# Patient Record
Sex: FEMALE | Race: WHITE | NOT HISPANIC OR LATINO | Employment: UNEMPLOYED | URBAN - METROPOLITAN AREA
[De-identification: names, ages, dates, MRNs, and addresses within clinical notes are randomized per-mention and may not be internally consistent; named-entity substitution may affect disease eponyms.]

---

## 2017-02-13 ENCOUNTER — HOSPITAL ENCOUNTER (OUTPATIENT)
Dept: OTHER | Facility: HOSPITAL | Age: 42
Discharge: HOME | End: 2017-02-13
Attending: PSYCHIATRY & NEUROLOGY | Admitting: UROLOGY

## 2017-02-13 LAB
ALBUMIN SERPL-MCNC: 3.4 G/DL (ref 3.5–4.8)
ALP SERPL-CCNC: 130 IU/L (ref 32–91)
ALT SERPL-CCNC: 14 IU/L (ref 14–54)
ANION GAP SERPL CALC-SCNC: 10.6 MMOL/L (ref 10–20)
AST SERPL-CCNC: 22 IU/L (ref 15–41)
BASOPHILS # BLD AUTO: 0.1 10*3/UL (ref 0–0.2)
BASOPHILS NFR BLD AUTO: 1 % (ref 0–2)
BILIRUB DIRECT SERPL-MCNC: 0.1 MG/DL (ref 0.1–0.5)
BILIRUB SERPL-MCNC: 0.3 MG/DL (ref 0.3–1.2)
BUN SERPL-MCNC: 10 MG/DL (ref 8–20)
BUN/CREAT SERPL: 16.7 (ref 5.4–26.2)
CALCIUM SERPL-MCNC: 8.9 MG/DL (ref 8.9–10.3)
CHLORIDE SERPL-SCNC: 95 MMOL/L (ref 101–111)
CONV CO2: 29 MMOL/L (ref 22–32)
CONV TOTAL PROTEIN: 7.1 G/DL (ref 6.1–7.9)
CREAT UR-MCNC: 0.6 MG/DL (ref 0.4–1)
DIFFERENTIAL METHOD BLD: (no result)
EOSINOPHIL # BLD AUTO: 0.5 10*3/UL (ref 0–0.3)
EOSINOPHIL # BLD AUTO: 5 % (ref 0–3)
ERYTHROCYTE [DISTWIDTH] IN BLOOD BY AUTOMATED COUNT: 16.3 % (ref 11.5–14.5)
GLUCOSE SERPL-MCNC: 93 MG/DL (ref 65–99)
HCT VFR BLD AUTO: 33.1 % (ref 35–49)
HGB BLD-MCNC: 11.1 G/DL (ref 12–15)
LYMPHOCYTES # BLD AUTO: 2.2 10*3/UL (ref 0.8–4.8)
LYMPHOCYTES NFR BLD AUTO: 25 % (ref 18–42)
MCH RBC QN AUTO: 28 PG (ref 26–32)
MCHC RBC AUTO-ENTMCNC: 33.5 G/DL (ref 32–36)
MCV RBC AUTO: 83.7 FL (ref 80–94)
MONOCYTES # BLD AUTO: 0.9 10*3/UL (ref 0.1–1.3)
MONOCYTES NFR BLD AUTO: 11 % (ref 2–11)
NEUTROPHILS # BLD AUTO: 5.1 10*3/UL (ref 2.3–8.6)
NEUTROPHILS NFR BLD AUTO: 58 % (ref 50–75)
NRBC BLD AUTO-RTO: 0 /100{WBCS}
NRBC/RBC NFR BLD MANUAL: 0 10*3/UL
PLATELET # BLD AUTO: 296 10*3/UL (ref 150–450)
PMV BLD AUTO: 9.7 FL (ref 7.4–10.4)
POTASSIUM SERPL-SCNC: 3.6 MMOL/L (ref 3.6–5.1)
RBC # BLD AUTO: 3.96 10*6/UL (ref 4–5.4)
SODIUM SERPL-SCNC: 131 MMOL/L (ref 136–144)
WBC # BLD AUTO: 8.8 10*3/UL (ref 4.5–11.5)

## 2017-05-22 ENCOUNTER — HOSPITAL ENCOUNTER (OUTPATIENT)
Dept: PREOP | Facility: HOSPITAL | Age: 42
Setting detail: HOSPITAL OUTPATIENT SURGERY
Discharge: HOME | End: 2017-05-22
Attending: DENTIST | Admitting: DENTIST

## 2019-01-24 ENCOUNTER — OFFICE (OUTPATIENT)
Dept: URBAN - METROPOLITAN AREA CLINIC 64 | Facility: CLINIC | Age: 44
End: 2019-01-24
Payer: COMMERCIAL

## 2019-01-24 VITALS
HEIGHT: 67 IN | WEIGHT: 162 LBS | SYSTOLIC BLOOD PRESSURE: 126 MMHG | HEART RATE: 65 BPM | DIASTOLIC BLOOD PRESSURE: 84 MMHG

## 2019-01-24 DIAGNOSIS — R11.2 NAUSEA WITH VOMITING, UNSPECIFIED: ICD-10-CM

## 2019-01-24 DIAGNOSIS — K59.00 CONSTIPATION, UNSPECIFIED: ICD-10-CM

## 2019-01-24 DIAGNOSIS — R10.84 GENERALIZED ABDOMINAL PAIN: ICD-10-CM

## 2019-01-24 DIAGNOSIS — K21.9 GASTRO-ESOPHAGEAL REFLUX DISEASE WITHOUT ESOPHAGITIS: ICD-10-CM

## 2019-01-24 PROCEDURE — 99214 OFFICE O/P EST MOD 30 MIN: CPT | Performed by: NURSE PRACTITIONER

## 2019-01-24 RX ORDER — SUCRALFATE 1 G/10ML
800 SUSPENSION ORAL
Qty: 1600 | Refills: 0 | Status: COMPLETED
Start: 2019-01-24 | End: 2019-03-18

## 2019-03-18 ENCOUNTER — OFFICE (OUTPATIENT)
Dept: URBAN - METROPOLITAN AREA CLINIC 64 | Facility: CLINIC | Age: 44
End: 2019-03-18
Payer: COMMERCIAL

## 2019-03-18 VITALS
WEIGHT: 155 LBS | HEART RATE: 78 BPM | DIASTOLIC BLOOD PRESSURE: 75 MMHG | SYSTOLIC BLOOD PRESSURE: 111 MMHG | HEIGHT: 67 IN

## 2019-03-18 DIAGNOSIS — R76.8 OTHER SPECIFIED ABNORMAL IMMUNOLOGICAL FINDINGS IN SERUM: ICD-10-CM

## 2019-03-18 DIAGNOSIS — K59.00 CONSTIPATION, UNSPECIFIED: ICD-10-CM

## 2019-03-18 DIAGNOSIS — Z72.0 TOBACCO USE: ICD-10-CM

## 2019-03-18 DIAGNOSIS — R11.2 NAUSEA WITH VOMITING, UNSPECIFIED: ICD-10-CM

## 2019-03-18 PROCEDURE — 99214 OFFICE O/P EST MOD 30 MIN: CPT | Performed by: NURSE PRACTITIONER

## 2019-03-18 RX ORDER — OMEPRAZOLE 40 MG/1
40 CAPSULE, DELAYED RELEASE ORAL
Qty: 30 | Refills: 3 | Status: COMPLETED
Start: 2016-09-15 | End: 2022-06-24

## 2019-03-18 RX ORDER — SUCRALFATE 1 G/10ML
800 SUSPENSION ORAL
Qty: 1600 | Refills: 0 | Status: COMPLETED
Start: 2019-01-24 | End: 2019-03-18

## 2019-03-18 RX ORDER — PLECANATIDE 3 MG/1
3 TABLET ORAL
Qty: 90 | Refills: 3 | Status: COMPLETED
Start: 2019-03-18 | End: 2019-05-16

## 2019-05-16 ENCOUNTER — OFFICE (OUTPATIENT)
Dept: URBAN - METROPOLITAN AREA CLINIC 64 | Facility: CLINIC | Age: 44
End: 2019-05-16
Payer: COMMERCIAL

## 2019-05-16 VITALS
WEIGHT: 156 LBS | HEIGHT: 67 IN | DIASTOLIC BLOOD PRESSURE: 75 MMHG | SYSTOLIC BLOOD PRESSURE: 118 MMHG | HEART RATE: 66 BPM

## 2019-05-16 DIAGNOSIS — R74.8 ABNORMAL LEVELS OF OTHER SERUM ENZYMES: ICD-10-CM

## 2019-05-16 DIAGNOSIS — Z80.0 FAMILY HISTORY OF MALIGNANT NEOPLASM OF DIGESTIVE ORGANS: ICD-10-CM

## 2019-05-16 DIAGNOSIS — K59.00 CONSTIPATION, UNSPECIFIED: ICD-10-CM

## 2019-05-16 PROCEDURE — 99213 OFFICE O/P EST LOW 20 MIN: CPT | Performed by: INTERNAL MEDICINE

## 2022-05-25 ENCOUNTER — HOSPITAL ENCOUNTER (EMERGENCY)
Facility: HOSPITAL | Age: 47
Discharge: HOME | End: 2022-05-26
Attending: EMERGENCY MEDICINE | Admitting: EMERGENCY MEDICINE

## 2022-05-25 DIAGNOSIS — R53.1 WEAKNESS: ICD-10-CM

## 2022-05-25 DIAGNOSIS — R56.9 SEIZURE: Primary | ICD-10-CM

## 2022-05-25 PROCEDURE — 99283 EMERGENCY DEPT VISIT LOW MDM: CPT

## 2022-05-25 PROCEDURE — 96374 THER/PROPH/DIAG INJ IV PUSH: CPT

## 2022-05-25 RX ORDER — LEVETIRACETAM 5 MG/ML
500 INJECTION INTRAVASCULAR EVERY 12 HOURS SCHEDULED
Status: DISCONTINUED | OUTPATIENT
Start: 2022-05-25 | End: 2022-05-26 | Stop reason: HOSPADM

## 2022-05-26 VITALS
RESPIRATION RATE: 15 BRPM | HEIGHT: 67 IN | TEMPERATURE: 97.9 F | BODY MASS INDEX: 22.49 KG/M2 | OXYGEN SATURATION: 92 % | WEIGHT: 143.3 LBS | DIASTOLIC BLOOD PRESSURE: 63 MMHG | SYSTOLIC BLOOD PRESSURE: 96 MMHG | HEART RATE: 80 BPM

## 2022-05-26 LAB
ANION GAP SERPL CALCULATED.3IONS-SCNC: 12 MMOL/L (ref 5–15)
BASOPHILS # BLD AUTO: 0.1 10*3/MM3 (ref 0–0.2)
BASOPHILS NFR BLD AUTO: 0.6 % (ref 0–1.5)
BUN SERPL-MCNC: 8 MG/DL (ref 6–20)
BUN/CREAT SERPL: 9.6 (ref 7–25)
CALCIUM SPEC-SCNC: 9.6 MG/DL (ref 8.6–10.5)
CHLORIDE SERPL-SCNC: 104 MMOL/L (ref 98–107)
CO2 SERPL-SCNC: 26 MMOL/L (ref 22–29)
CREAT SERPL-MCNC: 0.83 MG/DL (ref 0.57–1)
DEPRECATED RDW RBC AUTO: 47.7 FL (ref 37–54)
EGFRCR SERPLBLD CKD-EPI 2021: 86.5 ML/MIN/1.73
EOSINOPHIL # BLD AUTO: 0.4 10*3/MM3 (ref 0–0.4)
EOSINOPHIL NFR BLD AUTO: 2.3 % (ref 0.3–6.2)
ERYTHROCYTE [DISTWIDTH] IN BLOOD BY AUTOMATED COUNT: 15.3 % (ref 12.3–15.4)
GLUCOSE SERPL-MCNC: 90 MG/DL (ref 65–99)
HCT VFR BLD AUTO: 41.1 % (ref 34–46.6)
HGB BLD-MCNC: 13 G/DL (ref 12–15.9)
LYMPHOCYTES # BLD AUTO: 3.7 10*3/MM3 (ref 0.7–3.1)
LYMPHOCYTES NFR BLD AUTO: 21.7 % (ref 19.6–45.3)
MCH RBC QN AUTO: 28.4 PG (ref 26.6–33)
MCHC RBC AUTO-ENTMCNC: 31.5 G/DL (ref 31.5–35.7)
MCV RBC AUTO: 90.1 FL (ref 79–97)
MONOCYTES # BLD AUTO: 1.4 10*3/MM3 (ref 0.1–0.9)
MONOCYTES NFR BLD AUTO: 8.1 % (ref 5–12)
NEUTROPHILS NFR BLD AUTO: 11.6 10*3/MM3 (ref 1.7–7)
NEUTROPHILS NFR BLD AUTO: 67.3 % (ref 42.7–76)
NRBC BLD AUTO-RTO: 0 /100 WBC (ref 0–0.2)
PLATELET # BLD AUTO: 200 10*3/MM3 (ref 140–450)
PMV BLD AUTO: 9.5 FL (ref 6–12)
POTASSIUM SERPL-SCNC: 4.3 MMOL/L (ref 3.5–5.2)
RBC # BLD AUTO: 4.56 10*6/MM3 (ref 3.77–5.28)
SODIUM SERPL-SCNC: 142 MMOL/L (ref 136–145)
WBC NRBC COR # BLD: 17.2 10*3/MM3 (ref 3.4–10.8)

## 2022-05-26 PROCEDURE — 80048 BASIC METABOLIC PNL TOTAL CA: CPT | Performed by: EMERGENCY MEDICINE

## 2022-05-26 PROCEDURE — 25010000002 LEVETIRACETAM IN NACL 0.82% 500 MG/100ML SOLUTION: Performed by: EMERGENCY MEDICINE

## 2022-05-26 PROCEDURE — 96374 THER/PROPH/DIAG INJ IV PUSH: CPT

## 2022-05-26 PROCEDURE — 85025 COMPLETE CBC W/AUTO DIFF WBC: CPT | Performed by: EMERGENCY MEDICINE

## 2022-05-26 PROCEDURE — 36415 COLL VENOUS BLD VENIPUNCTURE: CPT | Performed by: EMERGENCY MEDICINE

## 2022-05-26 RX ADMIN — SODIUM CHLORIDE 500 ML: 9 INJECTION, SOLUTION INTRAVENOUS at 00:15

## 2022-05-26 RX ADMIN — LEVETIRACETAM 500 MG: 5 INJECTION INTRAVASCULAR at 00:15

## 2022-05-26 NOTE — ED PROVIDER NOTES
Subjective   Patient is a 47-year-old female who states she said multiple seizures today.  She states she has several seizures per week typically.  She denies complaints other than feeling weak and tired.  Family states that the patient has prolonged periods of confusion after her seizures.          Review of Systems   Constitutional: Negative for chills and fever.   HENT: Negative for congestion and sore throat.    Eyes: Negative.    Respiratory: Negative for cough and shortness of breath.    Cardiovascular: Negative for chest pain.   Gastrointestinal: Negative for abdominal pain, diarrhea, nausea and vomiting.   Endocrine: Negative for polyuria.   Genitourinary: Negative for dysuria and flank pain.   Musculoskeletal: Negative for back pain and myalgias.   Neurological: Positive for weakness. Negative for dizziness and headaches.   Psychiatric/Behavioral: Positive for confusion.       No past medical history on file.    No Known Allergies    No past surgical history on file.    No family history on file.    Social History     Socioeconomic History   • Marital status:            Objective   Physical Exam  Neurologic exam shows the patient be drowsy however she has no focal neurologic deficits.  HEENT exam shows TMs to be clear.  Oropharynx comers but sclera is nonicteric.  Neck has no adenopathy JVD or bruits.  Lungs are clear.  Heart has a regular rate rhythm without murmur or gallop.  Chest is nontender.  Abdomen is soft nontender.  Patient has normal bowel sounds.  Back has no CVA tenderness.  Extremity exam is no cyanosis or edema.  Procedures           ED Course      Results for orders placed or performed during the hospital encounter of 05/25/22   Basic Metabolic Panel    Specimen: Blood   Result Value Ref Range    Glucose 90 65 - 99 mg/dL    BUN 8 6 - 20 mg/dL    Creatinine 0.83 0.57 - 1.00 mg/dL    Sodium 142 136 - 145 mmol/L    Potassium 4.3 3.5 - 5.2 mmol/L    Chloride 104 98 - 107 mmol/L    CO2  26.0 22.0 - 29.0 mmol/L    Calcium 9.6 8.6 - 10.5 mg/dL    BUN/Creatinine Ratio 9.6 7.0 - 25.0    Anion Gap 12.0 5.0 - 15.0 mmol/L    eGFR 86.5 >60.0 mL/min/1.73   CBC Auto Differential    Specimen: Blood   Result Value Ref Range    WBC 17.20 (H) 3.40 - 10.80 10*3/mm3    RBC 4.56 3.77 - 5.28 10*6/mm3    Hemoglobin 13.0 12.0 - 15.9 g/dL    Hematocrit 41.1 34.0 - 46.6 %    MCV 90.1 79.0 - 97.0 fL    MCH 28.4 26.6 - 33.0 pg    MCHC 31.5 31.5 - 35.7 g/dL    RDW 15.3 12.3 - 15.4 %    RDW-SD 47.7 37.0 - 54.0 fl    MPV 9.5 6.0 - 12.0 fL    Platelets 200 140 - 450 10*3/mm3    Neutrophil % 67.3 42.7 - 76.0 %    Lymphocyte % 21.7 19.6 - 45.3 %    Monocyte % 8.1 5.0 - 12.0 %    Eosinophil % 2.3 0.3 - 6.2 %    Basophil % 0.6 0.0 - 1.5 %    Neutrophils, Absolute 11.60 (H) 1.70 - 7.00 10*3/mm3    Lymphocytes, Absolute 3.70 (H) 0.70 - 3.10 10*3/mm3    Monocytes, Absolute 1.40 (H) 0.10 - 0.90 10*3/mm3    Eosinophils, Absolute 0.40 0.00 - 0.40 10*3/mm3    Basophils, Absolute 0.10 0.00 - 0.20 10*3/mm3    nRBC 0.0 0.0 - 0.2 /100 WBC                                                  MDM  Number of Diagnoses or Management Options  Diagnosis management comments: Patient is no evidence of infectious or metabolic abnormality.  Electrolytes are within normal limits.  There is no renal insufficiency.  Patient was given Keppra as well as IV fluids.  On reexam she is at baseline.  She will be discharged follow with MD for recheck as needed.       Amount and/or Complexity of Data Reviewed  Clinical lab tests: reviewed    Risk of Complications, Morbidity, and/or Mortality  Presenting problems: high  Diagnostic procedures: high  Management options: high    Patient Progress  Patient progress: stable      Final diagnoses:   Seizure (HCC)   Weakness       ED Disposition  ED Disposition     ED Disposition   Discharge    Condition   Stable    Comment   --             No follow-up provider specified.       Medication List      No changes were made to  your prescriptions during this visit.          Nitin Dyer MD  05/26/22 2267

## 2022-06-24 ENCOUNTER — OFFICE (OUTPATIENT)
Dept: URBAN - METROPOLITAN AREA CLINIC 64 | Facility: CLINIC | Age: 47
End: 2022-06-24
Payer: COMMERCIAL

## 2022-06-24 VITALS
DIASTOLIC BLOOD PRESSURE: 87 MMHG | HEIGHT: 67 IN | WEIGHT: 148 LBS | SYSTOLIC BLOOD PRESSURE: 117 MMHG | HEART RATE: 81 BPM

## 2022-06-24 DIAGNOSIS — B18.2 CHRONIC VIRAL HEPATITIS C: ICD-10-CM

## 2022-06-24 PROCEDURE — 99204 OFFICE O/P NEW MOD 45 MIN: CPT | Performed by: INTERNAL MEDICINE

## 2022-06-24 RX ORDER — POLYETHYLENE GLYCOL 3350 17 G/17G
POWDER, FOR SOLUTION ORAL
Qty: 525 | Refills: 3 | Status: ACTIVE
Start: 2022-06-24

## 2022-06-24 RX ORDER — OMEPRAZOLE 40 MG/1
CAPSULE, DELAYED RELEASE ORAL
Qty: 30 | Refills: 3 | Status: ACTIVE

## 2023-04-26 ENCOUNTER — OFFICE (OUTPATIENT)
Dept: URBAN - METROPOLITAN AREA CLINIC 64 | Facility: CLINIC | Age: 48
End: 2023-04-26
Payer: COMMERCIAL

## 2023-04-26 VITALS
WEIGHT: 156.4 LBS | HEIGHT: 67 IN | DIASTOLIC BLOOD PRESSURE: 77 MMHG | HEART RATE: 113 BPM | SYSTOLIC BLOOD PRESSURE: 101 MMHG

## 2023-04-26 DIAGNOSIS — Z86.010 PERSONAL HISTORY OF COLONIC POLYPS: ICD-10-CM

## 2023-04-26 DIAGNOSIS — F17.200 NICOTINE DEPENDENCE, UNSPECIFIED, UNCOMPLICATED: ICD-10-CM

## 2023-04-26 DIAGNOSIS — R11.0 NAUSEA: ICD-10-CM

## 2023-04-26 DIAGNOSIS — Z80.0 FAMILY HISTORY OF MALIGNANT NEOPLASM OF DIGESTIVE ORGANS: ICD-10-CM

## 2023-04-26 DIAGNOSIS — F12.10 CANNABIS ABUSE, UNCOMPLICATED: ICD-10-CM

## 2023-04-26 DIAGNOSIS — K59.00 CONSTIPATION, UNSPECIFIED: ICD-10-CM

## 2023-04-26 PROCEDURE — 99214 OFFICE O/P EST MOD 30 MIN: CPT | Performed by: INTERNAL MEDICINE

## 2023-04-26 RX ORDER — LINACLOTIDE 290 UG/1
290 CAPSULE, GELATIN COATED ORAL
Qty: 90 | Refills: 3 | Status: ACTIVE
Start: 2023-04-26

## 2023-04-26 RX ORDER — SUCRALFATE 1 G/1
TABLET ORAL
Qty: 60 | Refills: 2 | Status: ACTIVE
Start: 2023-04-26

## 2023-07-05 ENCOUNTER — ON CAMPUS - OUTPATIENT (OUTPATIENT)
Dept: URBAN - METROPOLITAN AREA HOSPITAL 77 | Facility: HOSPITAL | Age: 48
End: 2023-07-05
Payer: COMMERCIAL

## 2023-07-05 DIAGNOSIS — D12.8 BENIGN NEOPLASM OF RECTUM: ICD-10-CM

## 2023-07-05 DIAGNOSIS — K64.1 SECOND DEGREE HEMORRHOIDS: ICD-10-CM

## 2023-07-05 DIAGNOSIS — K52.9 NONINFECTIVE GASTROENTERITIS AND COLITIS, UNSPECIFIED: ICD-10-CM

## 2023-07-05 PROCEDURE — 45385 COLONOSCOPY W/LESION REMOVAL: CPT | Performed by: INTERNAL MEDICINE

## 2023-07-05 PROCEDURE — 45380 COLONOSCOPY AND BIOPSY: CPT | Mod: 59 | Performed by: INTERNAL MEDICINE

## 2023-07-20 ENCOUNTER — OFFICE (OUTPATIENT)
Dept: URBAN - METROPOLITAN AREA CLINIC 64 | Facility: CLINIC | Age: 48
End: 2023-07-20
Payer: COMMERCIAL

## 2023-07-20 VITALS
SYSTOLIC BLOOD PRESSURE: 109 MMHG | HEART RATE: 74 BPM | HEIGHT: 67 IN | DIASTOLIC BLOOD PRESSURE: 80 MMHG | WEIGHT: 160 LBS

## 2023-07-20 DIAGNOSIS — R10.31 RIGHT LOWER QUADRANT PAIN: ICD-10-CM

## 2023-07-20 DIAGNOSIS — R74.8 ABNORMAL LEVELS OF OTHER SERUM ENZYMES: ICD-10-CM

## 2023-07-20 DIAGNOSIS — K59.00 CONSTIPATION, UNSPECIFIED: ICD-10-CM

## 2023-07-20 DIAGNOSIS — R11.0 NAUSEA: ICD-10-CM

## 2023-07-20 PROCEDURE — 99214 OFFICE O/P EST MOD 30 MIN: CPT

## 2023-07-20 RX ORDER — ONDANSETRON HYDROCHLORIDE 8 MG/1
TABLET, FILM COATED ORAL
Qty: 20 | Refills: 1 | Status: ACTIVE

## 2023-07-20 RX ORDER — LINACLOTIDE 290 UG/1
290 CAPSULE, GELATIN COATED ORAL
Qty: 90 | Refills: 3 | Status: ACTIVE
Start: 2023-04-26

## 2023-12-18 ENCOUNTER — OFFICE VISIT (OUTPATIENT)
Dept: NEUROLOGY | Facility: CLINIC | Age: 48
End: 2023-12-18
Payer: MEDICAID

## 2023-12-18 VITALS — WEIGHT: 171 LBS | HEIGHT: 67 IN | BODY MASS INDEX: 26.84 KG/M2

## 2023-12-18 DIAGNOSIS — M79.675 PAIN IN TOES OF BOTH FEET: ICD-10-CM

## 2023-12-18 DIAGNOSIS — M47.812 CERVICAL SPONDYLOSIS: ICD-10-CM

## 2023-12-18 DIAGNOSIS — M79.674 PAIN IN TOES OF BOTH FEET: ICD-10-CM

## 2023-12-18 DIAGNOSIS — R20.2 PARESTHESIA OF RIGHT ARM: ICD-10-CM

## 2023-12-18 DIAGNOSIS — G43.119 INTRACTABLE MIGRAINE WITH AURA WITHOUT STATUS MIGRAINOSUS: ICD-10-CM

## 2023-12-18 DIAGNOSIS — R41.3 MEMORY LOSS: Primary | ICD-10-CM

## 2023-12-18 PROBLEM — M54.50 LOW BACK PAIN: Status: ACTIVE | Noted: 2023-12-18

## 2023-12-18 PROBLEM — M79.18 MYOFASCIAL PAIN: Status: ACTIVE | Noted: 2022-10-07

## 2023-12-18 PROCEDURE — 1160F RVW MEDS BY RX/DR IN RCRD: CPT | Performed by: PSYCHIATRY & NEUROLOGY

## 2023-12-18 PROCEDURE — 99204 OFFICE O/P NEW MOD 45 MIN: CPT | Performed by: PSYCHIATRY & NEUROLOGY

## 2023-12-18 PROCEDURE — 1159F MED LIST DOCD IN RCRD: CPT | Performed by: PSYCHIATRY & NEUROLOGY

## 2023-12-18 RX ORDER — ROSUVASTATIN CALCIUM 40 MG/1
40 TABLET, COATED ORAL
COMMUNITY
Start: 2023-12-12

## 2023-12-18 RX ORDER — IBUPROFEN 600 MG/1
1 TABLET ORAL EVERY 6 HOURS
COMMUNITY
Start: 2023-11-27

## 2023-12-18 RX ORDER — SUMATRIPTAN 50 MG/1
50 TABLET, FILM COATED ORAL 2 TIMES DAILY PRN
COMMUNITY

## 2023-12-18 RX ORDER — LIDOCAINE 50 MG/G
PATCH TOPICAL
COMMUNITY
Start: 2023-11-13

## 2023-12-18 RX ORDER — MIRTAZAPINE 45 MG/1
45 TABLET, FILM COATED ORAL
COMMUNITY

## 2023-12-18 RX ORDER — OLANZAPINE 20 MG/1
TABLET ORAL
COMMUNITY

## 2023-12-18 RX ORDER — ONDANSETRON 4 MG/1
TABLET, FILM COATED ORAL
COMMUNITY

## 2023-12-18 RX ORDER — AMLODIPINE BESYLATE 5 MG/1
1 TABLET ORAL DAILY
COMMUNITY
Start: 2023-12-07

## 2023-12-18 RX ORDER — LAMOTRIGINE 25 MG/1
1 TABLET ORAL EVERY 12 HOURS SCHEDULED
COMMUNITY
Start: 2023-11-08

## 2023-12-18 RX ORDER — BUPRENORPHINE HYDROCHLORIDE AND NALOXONE HYDROCHLORIDE DIHYDRATE 8; 2 MG/1; MG/1
1 TABLET SUBLINGUAL DAILY
COMMUNITY

## 2023-12-18 RX ORDER — IBUPROFEN 800 MG/1
TABLET ORAL
COMMUNITY

## 2023-12-18 RX ORDER — DULOXETIN HYDROCHLORIDE 60 MG/1
60 CAPSULE, DELAYED RELEASE ORAL DAILY
COMMUNITY

## 2023-12-18 RX ORDER — ALBUTEROL SULFATE 90 UG/1
AEROSOL, METERED RESPIRATORY (INHALATION)
COMMUNITY

## 2023-12-18 RX ORDER — CITALOPRAM HYDROBROMIDE 10 MG/1
TABLET ORAL
COMMUNITY

## 2023-12-18 RX ORDER — OMEPRAZOLE 40 MG/1
1 CAPSULE, DELAYED RELEASE ORAL DAILY
COMMUNITY
Start: 2023-11-22

## 2023-12-18 RX ORDER — PROMETHAZINE HYDROCHLORIDE 12.5 MG/1
TABLET ORAL
COMMUNITY
Start: 2023-12-11

## 2023-12-18 RX ORDER — LEVETIRACETAM 1000 MG/1
TABLET ORAL
COMMUNITY

## 2023-12-18 RX ORDER — CHLORHEXIDINE GLUCONATE ORAL RINSE 1.2 MG/ML
SOLUTION DENTAL
COMMUNITY

## 2023-12-18 RX ORDER — LAMOTRIGINE 100 MG/1
TABLET ORAL
COMMUNITY
Start: 2023-12-15

## 2023-12-18 RX ORDER — BENZONATATE 100 MG/1
CAPSULE ORAL
COMMUNITY

## 2023-12-18 RX ORDER — GABAPENTIN 800 MG/1
800 TABLET ORAL 4 TIMES DAILY
COMMUNITY

## 2023-12-18 RX ORDER — LISDEXAMFETAMINE DIMESYLATE 60 MG/1
1 CAPSULE ORAL DAILY
COMMUNITY
Start: 2023-11-17

## 2023-12-18 RX ORDER — ATORVASTATIN CALCIUM 20 MG/1
20 TABLET, FILM COATED ORAL
COMMUNITY

## 2023-12-18 RX ORDER — AMITRIPTYLINE HYDROCHLORIDE 25 MG/1
TABLET, FILM COATED ORAL
COMMUNITY

## 2023-12-18 RX ORDER — AZELASTINE 1 MG/ML
SPRAY, METERED NASAL
COMMUNITY

## 2023-12-18 RX ORDER — LEVETIRACETAM 750 MG/1
2 TABLET ORAL EVERY 12 HOURS SCHEDULED
COMMUNITY
Start: 2023-12-04

## 2023-12-18 NOTE — PROGRESS NOTES
Chief Complaint  Headache and Peripheral Neuropathy    Subjective            Xochitl Yao presents to [facility] for HEADACHE  History of Present Illness  New patient referred by Arabella MAYNARD for headaches    Migraines  As a teenager started having migraine  Sister has had migraine.    Onset: over five years  Location:whole head  Quantity: pressure  Frequency: 6-7 a moth  Duration: last 2-3 hours  Medication: imitrex  Symptoms: nausea  Worsening factors: stress  Alleviating factors: imitrex, dark room, close eyes    She fell off a horse in 2001 since then has neck pain. And right arm pain tingling in arm radiates down arm     Neuropathy BLE, onset 4 years. Never had EMG  Toes on fire at times, no numbness or tingling.     Having trouble with memory     She has seen neurologist in the past for neuropathy, Dr. Lazo.  She reports no testing was done.    No xray of neck recently .       Family History   Problem Relation Age of Onset    Migraines Mother     Dementia Mother        Past Medical History:   Diagnosis Date    Peripheral neuropathy     Seizures        Social History     Socioeconomic History    Marital status:    Tobacco Use    Smoking status: Every Day     Packs/day: 1.50     Years: 30.00     Additional pack years: 0.00     Total pack years: 45.00     Types: Cigarettes    Smokeless tobacco: Never   Vaping Use    Vaping Use: Never used   Substance and Sexual Activity    Alcohol use: Not Currently    Drug use: Not Currently     Comment: pain    Sexual activity: Defer         Current Outpatient Medications:     albuterol sulfate  (90 Base) MCG/ACT inhaler, INHALE 1-2 PUFFS EVERY 4 TO 6 HOURS AS NEEDED, Disp: , Rfl:     amLODIPine (NORVASC) 5 MG tablet, Take 1 tablet by mouth Daily., Disp: , Rfl:     DULoxetine (CYMBALTA) 60 MG capsule, Take 1 capsule by mouth Daily., Disp: , Rfl:     gabapentin (NEURONTIN) 800 MG tablet, Take 1 tablet by mouth 4 (Four) Times a  Day., Disp: , Rfl:     ibuprofen (ADVIL,MOTRIN) 600 MG tablet, Take 1 tablet by mouth Every 6 (Six) Hours., Disp: , Rfl:     lamoTRIgine (LaMICtal) 100 MG tablet, , Disp: , Rfl:     levETIRAcetam (KEPPRA) 750 MG tablet, Take 2 tablets by mouth Every 12 (Twelve) Hours., Disp: , Rfl:     mirtazapine (REMERON) 45 MG tablet, Take 1 tablet by mouth every night at bedtime., Disp: , Rfl:     OLANZapine (zyPREXA) 20 MG tablet, olanzapine 20 mg tablet  TAKE ONE TABLET BY MOUTH AT BEDTIME, Disp: , Rfl:     omeprazole (priLOSEC) 40 MG capsule, Take 1 capsule by mouth Daily., Disp: , Rfl:     ondansetron (ZOFRAN) 4 MG tablet, ondansetron HCl 4 mg tablet  TAKE 1 TABLET BY MOUTH EVERY 4-6 HOURS AS NEEDED FOR NAUSEA, Disp: , Rfl:     promethazine (PHENERGAN) 12.5 MG tablet, TAKE ONE TABLET BY MOUTH THREE TIMES DAILY ALTERNATING WITH ZOFRAN AS NEEDED, Disp: , Rfl:     rosuvastatin (CRESTOR) 40 MG tablet, Take 1 tablet by mouth every night at bedtime., Disp: , Rfl:     SUMAtriptan (IMITREX) 50 MG tablet, Take 1 tablet by mouth 2 (Two) Times a Day As Needed., Disp: , Rfl:     Vyvanse 60 MG capsule, Take 1 capsule by mouth Daily, Disp: , Rfl:     amitriptyline (ELAVIL) 25 MG tablet, amitriptyline 25 mg tablet  TAKE ONE TABLET BY MOUTH AT BEDTIME (Patient not taking: Reported on 12/18/2023), Disp: , Rfl:     atorvastatin (LIPITOR) 20 MG tablet, Take 1 tablet by mouth every night at bedtime. (Patient not taking: Reported on 12/18/2023), Disp: , Rfl:     azelastine (ASTELIN) 0.1 % nasal spray, azelastine 137 mcg (0.1 %) nasal spray aerosol  ONE SPRAY IN EACH NASAL TWICE DAILY (Patient not taking: Reported on 12/18/2023), Disp: , Rfl:     benzonatate (TESSALON) 100 MG capsule, benzonatate 100 mg capsule  TAKE 1 TO 2 TABLETS BY MOUTH THREE TIMES DAILY AS NEEDED cough (Patient not taking: Reported on 12/18/2023), Disp: , Rfl:     buprenorphine-naloxone (SUBOXONE) 8-2 MG per SL tablet, Place 1 tablet under the tongue Daily., Disp: , Rfl:      "chlorhexidine (PERIDEX) 0.12 % solution, SWISH AND SPIT 15 MILLILITERS BY MOUTH TWICE DAILY FOR FIVE DAYS (Patient not taking: Reported on 12/18/2023), Disp: , Rfl:     citalopram (CeleXA) 10 MG tablet, citalopram 10 mg tablet  TAKE ONE TABLET BY MOUTH EVERY DAY (Patient not taking: Reported on 12/18/2023), Disp: , Rfl:     ibuprofen (ADVIL,MOTRIN) 800 MG tablet, ibuprofen 800 mg tablet  TAKE 1 TABLET BY MOUTH THREE TIMES A DAY AS NEEDED FOR PAIN (Patient not taking: Reported on 12/18/2023), Disp: , Rfl:     lamoTRIgine (LaMICtal) 25 MG tablet, Take 1 tablet by mouth Every 12 (Twelve) Hours. (Patient not taking: Reported on 12/18/2023), Disp: , Rfl:     levETIRAcetam (KEPPRA) 1000 MG tablet, levetiracetam 1,000 mg tablet  TAKE ONE TABLET BY MOUTH TWICE DAILY (Patient not taking: Reported on 12/18/2023), Disp: , Rfl:     lidocaine (LIDODERM) 5 %, APPLY 1 PATCH TO SKIN ONCE A DAY APPLY FOR 12 HOURS, THEN REMOVE. (Patient not taking: Reported on 12/18/2023), Disp: , Rfl:     Review of Systems   Constitutional:  Positive for appetite change and fatigue.   HENT:  Positive for congestion.    Eyes:  Positive for discharge.   Musculoskeletal:  Positive for back pain, joint swelling, neck pain and neck stiffness.   Allergic/Immunologic: Positive for environmental allergies.   Neurological:  Positive for dizziness, seizures, weakness, light-headedness and headaches.   Psychiatric/Behavioral:  Positive for sleep disturbance. The patient is nervous/anxious and is hyperactive.    All other systems reviewed and are negative.           Objective   Vital Signs:   Ht 170.2 cm (67\")   Wt 77.6 kg (171 lb)   BMI 26.78 kg/m²     Physical Exam  Vitals reviewed.   Constitutional:       Appearance: Normal appearance.   HENT:      Nose: Nose normal.   Eyes:      Pupils: Pupils are equal, round, and reactive to light.   Cardiovascular:      Rate and Rhythm: Normal rate.   Pulmonary:      Effort: Pulmonary effort is normal. No respiratory " distress.   Neurological:      Mental Status: She is alert and oriented to person, place, and time.      Motor: Motor strength is normal.     Gait: Gait is intact.      Deep Tendon Reflexes:      Reflex Scores:       Bicep reflexes are 2+ on the right side and 2+ on the left side.       Patellar reflexes are 3+ on the right side and 3+ on the left side.       Achilles reflexes are 2+ on the right side and 2+ on the left side.  Psychiatric:         Mood and Affect: Mood normal.         Speech: Speech normal.        Result Review :                Neurologic Exam     Mental Status   Oriented to person, place, and time.   Attention: normal.   Speech: speech is normal   Level of consciousness: alert    Cranial Nerves     CN III, IV, VI   Pupils are equal, round, and reactive to light.    CN V   Facial sensation intact.     CN VII   Facial expression full, symmetric.     CN VIII   CN VIII normal.     Motor Exam   Muscle bulk: normal    Strength   Strength 5/5 throughout.     Sensory Exam   Light touch normal.     Gait, Coordination, and Reflexes     Gait  Gait: normal    Reflexes   Right biceps: 2+  Left biceps: 2+  Right patellar: 3+  Left patellar: 3+  Right achilles: 2+  Left achilles: 2+             Assessment and Plan    Diagnoses and all orders for this visit:    1. Memory loss (Primary)  -     Vitamin D,25-Hydroxy  -     TSH Rfx On Abnormal To Free T4  -     Copper, Serum  -     Ceruloplasmin  -     Vitamin B6; Future  -     Vitamin E; Future  -     Vitamin B12; Future  -     Folate; Future  -     Comprehensive Metabolic Panel  -     T Pallidum Antibody (FTA-Ab); Future  -     CBC & Differential; Future    2. Pain in toes of both feet    3. Intractable migraine with aura without status migrainosus    4. Cervical spondylosis  -     EMG 1 Limb; Future    5. Paresthesia of right arm  -     EMG 1 Limb; Future    Pt has had migraine all life, for migraine continue imitrex prn    For the neck pain and arm paresthesia  will obtain emg of right arm and then possible mri neck    For the memory and possible neuropathy complaints will obtain labs      Follow Up   Return in about 6 months (around 6/18/2024) for return for emg of the right arm and follow up visit in 6 months.  Patient was given instructions and counseling regarding her condition or for health maintenance advice. Please see specific information pulled into the AVS if appropriate.         This document has been electronically signed by Joseph Seipel, MD on December 18, 2023 19:02 EST

## 2023-12-19 ENCOUNTER — TELEPHONE (OUTPATIENT)
Dept: NEUROLOGY | Facility: CLINIC | Age: 48
End: 2023-12-19

## 2023-12-19 NOTE — TELEPHONE ENCOUNTER
PATIENT CALLING TO REQUEST A DOCTORS NOTE FOR HER 12/18/23 APPOINTMENT THAT SHE CAN TURN IN AT SCHOOL.    PLEASE MAIL NOTE TO HOME ADDRESS.    THANK YOU

## 2024-03-11 RX ORDER — LEVETIRACETAM 750 MG/1
1500 TABLET ORAL 2 TIMES DAILY
Qty: 120 TABLET | Refills: 0 | OUTPATIENT
Start: 2024-03-11

## 2024-03-12 ENCOUNTER — TELEPHONE (OUTPATIENT)
Dept: NEUROLOGY | Facility: OTHER | Age: 49
End: 2024-03-12
Payer: MEDICAID

## 2024-03-13 DIAGNOSIS — G43.119 INTRACTABLE MIGRAINE WITH AURA WITHOUT STATUS MIGRAINOSUS: Primary | ICD-10-CM

## 2024-03-13 RX ORDER — LEVETIRACETAM 750 MG/1
1500 TABLET ORAL EVERY 12 HOURS SCHEDULED
Qty: 120 TABLET | Refills: 5 | Status: SHIPPED | OUTPATIENT
Start: 2024-03-13

## 2024-03-13 NOTE — TELEPHONE ENCOUNTER
Provider: SEIPEL, JOSEPH, MD    Caller: JOEY    Relationship to Patient: SELF    Phone Number: [phone]    Reason for Call: PT CALLING REGARDING REFILL REQUEST FOR THE KEPPRA.   STATED SHE IS OUT OF THE MEDICATION.    PLEASE CALL & ADVISE

## 2024-04-04 ENCOUNTER — TELEPHONE (OUTPATIENT)
Dept: NEUROLOGY | Facility: CLINIC | Age: 49
End: 2024-04-04
Payer: MEDICAID

## 2024-04-04 NOTE — TELEPHONE ENCOUNTER
Provider: SEIPEL    Caller: JOEY    Phone Number: [phone]     Reason for Call: PT CALLED TO TRY TO GET A SOONER APPT DUE TO HAVING VERTIGO. PT STATES THAT SHE HAS HAD THIS HAPPEN BEFORE BUT NOW SHE IS ALMOST BLACKING OUT. PT STATES SOMETIME IT LAST ALL DAY. PT ALSO STATES THAT HER NERVES ARE BAD THAT HER HANDS ARE SHAKING SHE HAS BLISTERS ON HER FACE.    PLEASE REVIEW AND ADVISE.  THANK YOU

## 2024-04-05 ENCOUNTER — HOSPITAL ENCOUNTER (EMERGENCY)
Facility: HOSPITAL | Age: 49
Discharge: HOME | End: 2024-04-05
Attending: EMERGENCY MEDICINE
Payer: MEDICAID

## 2024-04-05 VITALS
TEMPERATURE: 98.1 F | DIASTOLIC BLOOD PRESSURE: 91 MMHG | WEIGHT: 172 LBS | SYSTOLIC BLOOD PRESSURE: 129 MMHG | HEART RATE: 79 BPM | BODY MASS INDEX: 27 KG/M2 | RESPIRATION RATE: 17 BRPM | HEIGHT: 67 IN | OXYGEN SATURATION: 96 %

## 2024-04-05 DIAGNOSIS — S01.80XA: ICD-10-CM

## 2024-04-05 DIAGNOSIS — R21 RASH: Primary | ICD-10-CM

## 2024-04-05 DIAGNOSIS — G40.319 GENERALIZED IDIOPATHIC EPILEPSY AND EPILEPTIC SYNDROMES, INTRACTABLE, WITHOUT STATUS EPILEPTICUS: ICD-10-CM

## 2024-04-05 PROCEDURE — 25010000002 DEXAMETHASONE PER 1 MG

## 2024-04-05 PROCEDURE — 99283 EMERGENCY DEPT VISIT LOW MDM: CPT

## 2024-04-05 PROCEDURE — 87205 SMEAR GRAM STAIN: CPT

## 2024-04-05 PROCEDURE — 87070 CULTURE OTHR SPECIMN AEROBIC: CPT

## 2024-04-05 PROCEDURE — 96372 THER/PROPH/DIAG INJ SC/IM: CPT

## 2024-04-05 RX ORDER — DEXAMETHASONE SODIUM PHOSPHATE 4 MG/ML
8 INJECTION, SOLUTION INTRA-ARTICULAR; INTRALESIONAL; INTRAMUSCULAR; INTRAVENOUS; SOFT TISSUE ONCE
Status: COMPLETED | OUTPATIENT
Start: 2024-04-05 | End: 2024-04-05

## 2024-04-05 RX ORDER — METHYLPREDNISOLONE 4 MG/1
TABLET ORAL
Qty: 21 TABLET | Refills: 0 | Status: SHIPPED | OUTPATIENT
Start: 2024-04-05

## 2024-04-05 RX ADMIN — DEXAMETHASONE SODIUM PHOSPHATE 8 MG: 4 INJECTION, SOLUTION INTRAMUSCULAR; INTRAVENOUS at 12:10

## 2024-04-05 NOTE — ED NOTES
Pt ambulates to ED [facility]. Pt reports that she has had a rash on her face, bilateral arms and trunk x months. She reports that she was staying with a friend and was unaware that she had bed bugs and she got bit and got an infection. Pt reports that she has been seen by her PCP and is on her third round of antibiotics, currently on keflex. She reports that her rash is not getting any better and reports itching, burning and fluid filled blisters. She has been using dial soap Bid and has been applying antibacterial ointment and cortisone to the areas. She reports that she has an appt with dermatology but it is not until August. No other complaints at this time.

## 2024-04-05 NOTE — ED PROVIDER NOTES
Subjective   History of Present Illness  Chief Complaint: Rash      HPI: Patient is a 49-year-old female presents by private vehicle with complaints of rash and open wounds that have been ongoing for the last 5 to 6 months.  She initially believed that it was bedbugs exposures but she has removed herself from that situation and continues to have the open wounds she states that the areas will blister and rupture.  They are pruritic.  She is followed by [facility] who has put her on her third round of Keflex and continues to have the symptoms.  Area of rash and open wounds noted to her face as well as her neck trunk and bilateral arms.  Denies new lotions or creams she has been using antibacterial soap and water as well as hydrocortisone cream.  She has not been treated with steroids she is scheduled to see dermatology in August of this year.    PCP: [place]    History provided by:  Patient      Review of Systems  See above as noted    Past Medical History:   Diagnosis Date    Peripheral neuropathy     Seizures        No Known Allergies    History reviewed. No pertinent surgical history.    Family History   Problem Relation Age of Onset    Migraines Mother     Dementia Mother        Social History     Socioeconomic History    Marital status:    Tobacco Use    Smoking status: Every Day     Current packs/day: 1.50     Average packs/day: 1.5 packs/day for 30.0 years (45.0 ttl pk-yrs)     Types: Cigarettes    Smokeless tobacco: Never   Vaping Use    Vaping status: Never Used   Substance and Sexual Activity    Alcohol use: Not Currently    Drug use: Yes     Types: Marijuana     Comment: 3 times daily    Sexual activity: Defer           Objective   Physical Exam  Vitals reviewed.   Constitutional:       Appearance: She is not toxic-appearing.   HENT:      Head: Normocephalic.   Eyes:      Extraocular Movements: Extraocular movements intact.      Pupils: Pupils are equal, round, and reactive to light.  "  Cardiovascular:      Rate and Rhythm: Normal rate.      Pulses: Normal pulses.   Pulmonary:      Effort: Pulmonary effort is normal.   Skin:     Capillary Refill: Capillary refill takes less than 2 seconds.      Findings: Erythema, rash and wound present. Rash is pustular.      Comments: Wounds of various stages of healing noted to her face and neck trunk and bilateral arms.   Neurological:      General: No focal deficit present.      Mental Status: She is alert and oriented to person, place, and time.         Procedures           ED Course      /91   Pulse 79   Temp 98.1 °F (36.7 °C) (Oral)   Resp 17   Ht 170.2 cm (67\")   Wt 78 kg (172 lb)   SpO2 96%   BMI 26.94 kg/m²   Labs Reviewed   WOUND CULTURE     Medications   dexAMETHasone (DECADRON) injection 8 mg (has no administration in time range)     No radiology results for the last day                                         Medical Decision Making  Patient presented with above complaints, noted physical exam was completed as patient has had 3 rounds of Keflex with minimal improvement MRSA was considered and wound culture was added and pending.  Will give the patient a dose of Decadron here with a prescription for Medrol sent to her preferred pharmacy we discussed proper wound care and to continue the Keflex until wound culture does return.  She is already scheduled to see dermatology.  We did discuss other creams and over-the-counter ointments that are appropriate.  Patient gave verbal understanding and felt comfortable with this plan of care, denied further questions or complaints.    Chart review: 3/13/2024 outpatient visit with neurology Dr. Seipel, intractable migraine with aura      Note Disclaimer: At [facility], we believe that sharing information builds trust and better  relationships. You are receiving this note because you recently visited [facility]. It is possible you will see health information before a provider has talked with " you about it. This kind of information can be easy to misunderstand. To help you fully understand what it means for your health, we urge you to discuss this note with your provider.       Part of this note may be an electronic transcription/translation of spoken language to printed text using the Dragon Dictation System.    Appropriate PPE worn during exam.    Amount and/or Complexity of Data Reviewed  External Data Reviewed: notes.        Final diagnoses:   Rash   Multiple open wounds of face, initial encounter       ED Disposition  ED Disposition       ED Disposition   Discharge    Condition   Stable    Comment   --               Arabella Hernandez, APRN  [address] [facility] [place] [id number] [phone]          [facility] [place] [id number] [phone]             Medication List        New Prescriptions      methylPREDNISolone 4 MG dose pack  Commonly known as: MEDROL  Take as directed on package instructions.               Where to Get Your Medications        These medications were sent to [email or web address] - [place], [place] - [address] - [phone]  - [phone] FX  [address] [place] [address]      Phone: [phone]   methylPREDNISolone 4 MG dose pack            Estelita Hardwick, APRN  04/05/24 5828

## 2024-04-05 NOTE — DISCHARGE INSTRUCTIONS
Follow-up on wound culture, that will be pending at the time of your discharge.    Prescription for Medrol Dosepak was sent to your preferred pharmacy.  10 you to wash the areas with antibacterial soap and water can apply Aquaphor or Vaseline to the areas of discomfort.    Follow-up with PCP    Return to the ER for new or worsening symptoms

## 2024-04-05 NOTE — TELEPHONE ENCOUNTER
. Memory loss (Primary)  2. Pain in toes of both feet   3. Intractable migraine with aura without status migrainosus   4. Cervical spondylosis   5. Paresthesia of right arm  She was seen for these issues

## 2024-04-05 NOTE — TELEPHONE ENCOUNTER
Caller: Xochitl GILDA Mannkasey  Relationship: SELF  Best call back number:   Telephone Information:   Mobile [phone]         Requested Prescriptions:   Requested Prescriptions     Pending Prescriptions Disp Refills    gabapentin (NEURONTIN) 800 MG tablet [Pharmacy Med Name: gabapentin 800 mg tablet] 120 tablet 3     Sig: TAKE ONE TABLET BY MOUTH FOUR TIMES DAILY        Pharmacy where request should be sent: [facility] [address] [phone] [facility] [phone] FX     Last office visit with prescribing clinician: 12/18/2023   Last telemedicine visit with prescribing clinician: Visit date not found   Next office visit with prescribing clinician: 8/20/2024     Additional details provided by patient: PT STATES SHE ONLY HAS A FEW TABLETS LEFT.     Does the patient have less than a 3 day supply:  [] Yes  [x] No    Would you like a call back once the refill request has been completed: [] Yes [x] No    If the office needs to give you a call back, can they leave a voicemail: [] Yes [x] No    Brain Redmond Rep   04/05/24 10:29 EDT

## 2024-04-08 ENCOUNTER — TELEPHONE (OUTPATIENT)
Dept: NEUROLOGY | Facility: CLINIC | Age: 49
End: 2024-04-08

## 2024-04-08 LAB
BACTERIA SPEC AEROBE CULT: NORMAL
GRAM STN SPEC: NORMAL
GRAM STN SPEC: NORMAL

## 2024-04-08 NOTE — TELEPHONE ENCOUNTER
}    Medication requested (name and dose):      Gabapentin 800 mg tablet  Take 1 tablet by mouth 4 (Four) Times a Day     Pharmacy where request should be sent:      [place], [place] - [address] [place] [address] AVE - [phone] [facility] [phone] FX     Additional details provided by patient:      Best call back number:  [phone]    Does the patient have less than a 3 day supply:  [x] Yes  [] No    Brain Ivy Rep  04/08/24, 09:18 EDT 5}

## 2024-04-08 NOTE — TELEPHONE ENCOUNTER
Caller: JOEY    Relationship: SELF    Best call back number: [phone]      Which medication are you concerned about: GABAPENTIN    What are your concerns: PT STATES SHE HAS BEEN COMPLETELY OUT OF MEDICATION SINCE 4-424.     THERE ARE MULTIPLE ENCOUNTER OPEN REGARDING THIS REFILL BUT SINCE MY ORINGAL WAS TILL OPEN SO MESSAGE WAS ROUTED IN THIS ENCOUNTER.

## 2024-04-09 RX ORDER — GABAPENTIN 800 MG/1
TABLET ORAL
Qty: 120 TABLET | Refills: 5 | Status: SHIPPED | OUTPATIENT
Start: 2024-04-09

## 2024-04-09 RX ORDER — GABAPENTIN 800 MG/1
800 TABLET ORAL 4 TIMES DAILY
OUTPATIENT
Start: 2024-04-09

## 2024-04-09 NOTE — TELEPHONE ENCOUNTER
MEDICATION REFILL REQUEST    Caller: Xochitl Yao    Relationship: Self    Best call back number: [phone]    Requested Prescriptions:   Requested Prescriptions     Pending Prescriptions Disp Refills    gabapentin (NEURONTIN) 800 MG tablet       Sig: Take 1 tablet by mouth 4 (Four) Times a Day.      Pharmacy where request should be sent: [address] - [phone]  - [phone] FX     Last office visit with prescribing clinician: 12/18/2023   Last telemedicine visit with prescribing clinician: Visit date not found   Next office visit with prescribing clinician: 4/8/2024     Additional details provided by patient: PT CALLED TO CHECK STATUS OF GABAPENTIN REFILL REQUEST. SHE REPORTS SHE HAS BEEN COMPLETELY OUT OF THE GABAPENTIN MEDICATION SINCE LAST FRIDAY, 4/5/24.    Does the patient have less than a 3 day supply?:  [x] Yes  [] No    Would you like a call back once the refill request has been completed?: [] Yes  [x] No    If the office needs to give you a call back, can they leave a voicemail?: [] Yes  [x] No    PLEASE REVIEW AND ADVISE.    Brain Hammond Rep   04/09/24 09:12 EDT

## 2024-04-09 NOTE — TELEPHONE ENCOUNTER
PT CALLED BACK TO CHECK ON STATUS OF MEDICATION REFILL. PT HAS BEEN OUT OF MEDICATION SINCE 04/04/024.

## 2024-06-04 NOTE — PROGRESS NOTES
"Chief Complaint  Dizziness    Subjective            Xochitl Yao presents to [facility]UROLOG[facility] for Dizziness.  History of Present Illness    Xochitl Yao is a 49-year-old patient seen today for episodic dizziness.  Patient saw Dr. Seipel in 2023 but never followed-up and did not complete testing (labs and EMG).  Patient reports having episodes of dizziness that started years ago,  this improved but, about 5 months ago symptoms worsened.  She feels like she is going to lose consciousness when leaning over and standing up too fast or from a sitting to a standing position.  Patient admits that she has not seen her primary care provider about this.    The patient has migraines headaches 2-3 times a week with throbbing sensation and light and sound sensitivity.  The pain is located at her temples and feels like a \"vice\".  She has nausea and vomiting.  Symptoms last several hours.  Patient admit to having a lot of stressors in her life.  She smokes 1 pack of cigarettes/day and is not interested in quitting.  Patient smokes marijuana daily.  She only drinks around 20 to 40 ounces of water and admits to having a poor diet.  She has insomnia but has never been tested for sleep apnea.  She snores and never wakes up feeling refreshed.      Complaint: Dizziness  Onset: few years ago  quality: feels like she is going to black out  Duration:few second  Frequency: everyday  Timing: anytime, mainly when she stands up  Worsening factors: heat  Alleviating factors: nothing  associated Signs and symptoms: nausea  Current meds:   Past medications:         Orthostatics  Lyin/78  Sittin/70  Standin/61        Family History   Problem Relation Age of Onset    Migraines Mother     Dementia Mother        Past Medical History:   Diagnosis Date    Peripheral neuropathy     Seizures        Social History     Socioeconomic History    Marital status:    Tobacco Use    Smoking status: Every " Day     Current packs/day: 1.50     Average packs/day: 1.5 packs/day for 30.0 years (45.0 ttl pk-yrs)     Types: Cigarettes    Smokeless tobacco: Never   Vaping Use    Vaping status: Never Used   Substance and Sexual Activity    Alcohol use: Not Currently    Drug use: Yes     Types: Marijuana     Comment: 3 times daily    Sexual activity: Defer         Current Outpatient Medications:     albuterol sulfate  (90 Base) MCG/ACT inhaler, INHALE 1-2 PUFFS EVERY 4 TO 6 HOURS AS NEEDED, Disp: , Rfl:     amLODIPine (NORVASC) 5 MG tablet, Take 1 tablet by mouth Daily., Disp: , Rfl:     buprenorphine-naloxone (SUBOXONE) 8-2 MG per SL tablet, Place 1 tablet under the tongue Daily., Disp: , Rfl:     DULoxetine (CYMBALTA) 60 MG capsule, Take 1 capsule by mouth Daily., Disp: , Rfl:     gabapentin (NEURONTIN) 800 MG tablet, One tab qid, Disp: 120 tablet, Rfl: 5    ibuprofen (ADVIL,MOTRIN) 600 MG tablet, Take 1 tablet by mouth Every 6 (Six) Hours., Disp: , Rfl:     levETIRAcetam (KEPPRA) 750 MG tablet, Take 2 tablets by mouth Every 12 (Twelve) Hours., Disp: 120 tablet, Rfl: 5    mirtazapine (REMERON) 45 MG tablet, Take 1 tablet by mouth every night at bedtime., Disp: , Rfl:     OLANZapine (zyPREXA) 20 MG tablet, olanzapine 20 mg tablet  TAKE ONE TABLET BY MOUTH AT BEDTIME, Disp: , Rfl:     omeprazole (priLOSEC) 40 MG capsule, Take 1 capsule by mouth Daily., Disp: , Rfl:     ondansetron (ZOFRAN) 4 MG tablet, ondansetron HCl 4 mg tablet  TAKE 1 TABLET BY MOUTH EVERY 4-6 HOURS AS NEEDED FOR NAUSEA, Disp: , Rfl:     promethazine (PHENERGAN) 12.5 MG tablet, TAKE ONE TABLET BY MOUTH THREE TIMES DAILY ALTERNATING WITH ZOFRAN AS NEEDED, Disp: , Rfl:     rosuvastatin (CRESTOR) 40 MG tablet, Take 1 tablet by mouth every night at bedtime., Disp: , Rfl:     SUMAtriptan (IMITREX) 50 MG tablet, Take 50 mg PO at onset of migraine of migraine. May repeat x 1 2 hrs later if needed, Disp: 16 tablet, Rfl: 2    Vyvanse 60 MG capsule, Take 1  "capsule by mouth Daily, Disp: , Rfl:     Review of Systems   Constitutional:  Positive for fatigue.   HENT:  Positive for congestion, dental problem, drooling and voice change.    Eyes:  Positive for pain, itching and visual disturbance.   Respiratory:  Positive for cough, chest tightness and shortness of breath.    Cardiovascular:  Positive for chest pain and palpitations.   Gastrointestinal:  Positive for constipation and nausea.   Genitourinary:  Positive for frequency, pelvic pain and urgency.   Musculoskeletal:  Positive for arthralgias, back pain, gait problem, neck pain and neck stiffness.   Skin:  Positive for rash.   Allergic/Immunologic: Positive for environmental allergies.   Neurological:  Positive for weakness, light-headedness and headaches.   Hematological:  Bruises/bleeds easily.   Psychiatric/Behavioral:  Positive for sleep disturbance. The patient is nervous/anxious.    All other systems reviewed and are negative.           Objective   Vital Signs:   /70   Pulse 90   Ht 170.2 cm (67\")   Wt 78 kg (172 lb)   BMI 26.94 kg/m²     Physical Exam  Vitals reviewed.   Constitutional:       Appearance: She is well-developed.   HENT:      Head: Normocephalic and atraumatic.      Mouth/Throat:      Dentition: Abnormal dentition. Dental caries present.      Comments: MMP 2  Eyes:      Extraocular Movements: Extraocular movements intact and EOM normal.      Pupils: Pupils are equal, round, and reactive to light.   Neck:      Vascular: No carotid bruit.   Cardiovascular:      Rate and Rhythm: Normal rate.      Heart sounds: Murmur heard.   Pulmonary:      Effort: Pulmonary effort is normal.   Musculoskeletal:      Cervical back: Normal range of motion and neck supple.      Comments: Kyphotic posture   Skin:     General: Skin is warm and dry.      Findings: Lesion present. No rash.      Comments: Lesions present on arms face and legs.  Patient scratched/picked at lesions throughout visit.   Neurological: "      Mental Status: She is alert and oriented to person, place, and time.      Cranial Nerves: Cranial nerves 2-12 are intact. No cranial nerve deficit.      Sensory: No sensory deficit.      Motor: Motor function is intact. Weakness present. No tremor or atrophy.      Coordination: Coordination is intact. Finger-Nose-Finger Test, Heel to Shin Test and Heel to Shin Test normal.      Gait: Gait is intact.      Deep Tendon Reflexes: Reflexes are normal and symmetric.      Reflex Scores:       Tricep reflexes are 2+ on the right side and 2+ on the left side.       Bicep reflexes are 2+ on the right side and 2+ on the left side.       Brachioradialis reflexes are 2+ on the right side and 2+ on the left side.       Patellar reflexes are 2+ on the right side and 2+ on the left side.       Achilles reflexes are 2+ on the right side and 2+ on the left side.     Comments: Renee-Hallpike testing was negative; patient has giveaway weakness in both hip flexion and attributes this to discomfort   Psychiatric:         Attention and Perception: Attention normal.         Mood and Affect: Affect is flat.         Speech: Speech normal.         Behavior: Behavior normal.         Cognition and Memory: Cognition and memory normal.         Judgment: Judgment normal.        Result Review :                Neurologic Exam     Mental Status   Oriented to person, place, and time.   Attention: normal.   Speech: speech is normal   Level of consciousness: alert    Cranial Nerves   Cranial nerves II through XII intact.     CN II   Visual fields full to confrontation.     CN III, IV, VI   Pupils are equal, round, and reactive to light.  Extraocular motions are normal.     CN V   Facial sensation intact.     CN VII   Facial expression full, symmetric.     CN VIII   CN VIII normal.     CN IX, X   CN IX normal.   CN X normal.     CN XI   CN XI normal.     CN XII   CN XII normal.     Motor Exam   Muscle bulk: normal  Overall muscle tone: normal  Right  arm pronator drift: absent  Left arm pronator drift: absent    Strength   Right neck flexion: 5/5  Left neck flexion: 5/5  Right neck extension: 5/5  Left neck extension: 5/5  Right deltoid: 5/5  Left deltoid: 5/5  Right biceps: 5/5  Left biceps: 5/5  Right triceps: 5/5  Left triceps: 5/5  Right wrist flexion: 5/5  Left wrist flexion: 5/5  Right wrist extension: 5/5  Left wrist extension: 5/5  Right interossei: 5/5  Left interossei: 5/5  Right quadriceps: 5/5  Left quadriceps: 5/5  Right hamstrin/5  Left hamstrin/5  Right anterior tibial: 5/5  Left anterior tibial: 5/5  Right gastroc: 5/5  Left gastroc: 5/5Giveaway weakness hip flexion bilaterally (patient attributes to discomfort)     Sensory Exam   Light touch normal.   Right arm vibration: normal  Left arm vibration: normal    Gait, Coordination, and Reflexes     Gait  Gait: normal    Coordination   Finger to nose coordination: normal  Heel to shin coordination: normal    Tremor   Resting tremor: absent    Reflexes   Reflexes 2+ except as noted.   Right brachioradialis: 2+  Left brachioradialis: 2+  Right biceps: 2+  Left biceps: 2+  Right triceps: 2+  Left triceps: 2+  Right patellar: 2+  Left patellar: 2+  Right achilles: 2+  Left achilles: 2+             Assessment and Plan    Diagnoses and all orders for this visit:    1. Orthostatic hypotension (Primary)    2. Intractable migraine with aura without status migrainosus  -     SUMAtriptan (IMITREX) 50 MG tablet; Take 50 mg PO at onset of migraine of migraine. May repeat x 1 2 hrs later if needed  Dispense: 16 tablet; Refill: 2  -     Home Sleep Study; Future    3. Migraine with aura and without status migrainosus, not intractable    4. Daytime somnolence  -     Home Sleep Study; Future      Xochitl is seen today for new complaint of dizziness.  She states the symptoms were present many years ago but went away and then came back about 5 months ago.  She states that dizziness is described as a near syncopal  sensation.  She has not had loss of consciousness.  This occurs when she moves from a sitting to a standing position or from leaning over and standing up too quick.  She has throbbing sensation in her head with the dizziness that lasts a few seconds.  She has not seen her primary care provider about this.  The patient also admits to having migraines several times a week with light sensitivity, throbbing, nausea and vomiting.  She also has insomnia and admits to snoring and not waking up feeling refreshed.  She has never had a sleep study    Physical exam today neurologically the patient has giveaway weakness in both hip flexors.  Renee-Hallpike testing was negative bilaterally for nystagmus, but patient had dizziness in both head positions.  Patient's skin had open annular lesions on her head arms and legs.    Orthostatic hypotension: I recommend the patient talk to her PCP because the patient's systolic blood pressure today dropped more than 25 points (115-->89) from a lying to standing position.  They may need to adjust her blood pressure medication.    She has migraine several times a week but admits to having insomnia, snoring, and waking up feeling unrefreshed.  Order HST to determine if obstructive sleep apnea is playing a role in her symptoms.    We discussed lifestyle modifications to help control migraines including improving sleep, increasing water intake to 90 ounces a day, improving diet, quitting smoking.  The patient has not interested in quitting smoking today.    She will follow-up for a 31 to 90-day CPAP compliance if she qualifies for PAP therapy after HST.  Otherwise she will follow-up next available.    I spent 45 minutes caring for Xochitl on this date of service. This time includes time spent by me in the following activities:preparing for the visit, reviewing tests, performing a medically appropriate examination and/or evaluation , counseling and educating the patient/family/caregiver, ordering  medications, tests, or procedures, and documenting information in the medical record  Follow Up   Return for 31-90 days if qualifies for CPAP.  Patient was given instructions and counseling regarding her condition or for health maintenance advice. Please see specific information pulled into the AVS if appropriate.         This document has been electronically signed by ALEYDA Haro on June 6, 2024 17:19 EDT

## 2024-06-06 ENCOUNTER — OFFICE VISIT (OUTPATIENT)
Dept: NEUROLOGY | Facility: CLINIC | Age: 49
End: 2024-06-06
Payer: MEDICAID

## 2024-06-06 VITALS
HEART RATE: 90 BPM | BODY MASS INDEX: 27 KG/M2 | HEIGHT: 67 IN | SYSTOLIC BLOOD PRESSURE: 105 MMHG | WEIGHT: 172 LBS | DIASTOLIC BLOOD PRESSURE: 70 MMHG

## 2024-06-06 DIAGNOSIS — I95.1 ORTHOSTATIC HYPOTENSION: Primary | ICD-10-CM

## 2024-06-06 DIAGNOSIS — G43.109 MIGRAINE WITH AURA AND WITHOUT STATUS MIGRAINOSUS, NOT INTRACTABLE: ICD-10-CM

## 2024-06-06 DIAGNOSIS — G43.119 INTRACTABLE MIGRAINE WITH AURA WITHOUT STATUS MIGRAINOSUS: ICD-10-CM

## 2024-06-06 DIAGNOSIS — R40.0 DAYTIME SOMNOLENCE: ICD-10-CM

## 2024-06-06 RX ORDER — SUMATRIPTAN 50 MG/1
TABLET, FILM COATED ORAL
Qty: 16 TABLET | Refills: 2 | Status: SHIPPED | OUTPATIENT
Start: 2024-06-06

## 2024-06-14 ENCOUNTER — HOSPITAL ENCOUNTER (EMERGENCY)
Facility: HOSPITAL | Age: 49
Discharge: HOME | End: 2024-06-14
Payer: MEDICAID

## 2024-06-14 VITALS
DIASTOLIC BLOOD PRESSURE: 81 MMHG | WEIGHT: 168 LBS | SYSTOLIC BLOOD PRESSURE: 140 MMHG | OXYGEN SATURATION: 96 % | HEART RATE: 70 BPM | HEIGHT: 67 IN | BODY MASS INDEX: 26.37 KG/M2 | RESPIRATION RATE: 16 BRPM | TEMPERATURE: 98.4 F

## 2024-06-14 DIAGNOSIS — L28.2 PRURIGO: Primary | ICD-10-CM

## 2024-06-14 PROCEDURE — 99282 EMERGENCY DEPT VISIT SF MDM: CPT

## 2024-06-14 NOTE — ED PROVIDER NOTES
Subjective   History of Present Illness  Chief Complaint: Rash      HPI: Patient is a 49-year-old female who presents by private vehicle with complaints of generalized rash.  She states this has been ongoing for several months she has been evaluated by her PCP has had multiple rounds of Keflex has even followed up with dermatology who gave her 30 days of doxycycline she continues to have the rash and no improvement.  She has concerns related to infection.  She has no other symptoms including fever, body aches nausea or vomiting she has chronic dizziness and is evaluated by neurology.  She has had no associated chest pain or shortness of breath.  She denies new creams lotions or detergents, denies exposure to bedbugs or scabies.  States that she will begin having lesions that are flat and dark in color we will begin burning and she will begin scratching at them.    PCP: Austin Rosenthal  Neurology: Seipel  Derm: [facility]    History provided by:  Patient      Review of Systems  See above as noted    Past Medical History:   Diagnosis Date    Peripheral neuropathy     Seizures        No Known Allergies    No past surgical history on file.    Family History   Problem Relation Age of Onset    Migraines Mother     Dementia Mother        Social History     Socioeconomic History    Marital status:    Tobacco Use    Smoking status: Every Day     Current packs/day: 1.50     Average packs/day: 1.5 packs/day for 30.0 years (45.0 ttl pk-yrs)     Types: Cigarettes    Smokeless tobacco: Never   Vaping Use    Vaping status: Never Used   Substance and Sexual Activity    Alcohol use: Not Currently    Drug use: Yes     Types: Marijuana     Comment: 3 times daily    Sexual activity: Defer           Objective   Physical Exam  Vitals reviewed. Nursing note reviewed: Images provided in patient chart..  Constitutional:       Appearance: She is not ill-appearing or toxic-appearing.   HENT:      Head: Normocephalic.   Eyes:  "     Pupils: Pupils are equal, round, and reactive to light.   Skin:     Comments: Ulcerations noted on patient's arms, bilateral as well as face.  Various stages of healing, there is no purulent drainage no streaking or erythema noted.   Neurological:      General: No focal deficit present.      Mental Status: She is alert and oriented to person, place, and time. Mental status is at baseline.   Psychiatric:      Comments: Restless, upright and pacing around the room during my initial evaluation.         Procedures             ED Course  ED Course as of 06/14/24 2050 Fri Jun 14, 2024   1353 Attempted to contact [facility] to discuss plan of care for this patient.     []   1410 Spoke with Stephanie with [facility], advised follow up outpatient. No further treatment form emergency stand point.  []      ED Course User Index  [] Estelita Hardwick, ALEYDA      /81 (BP Location: Left arm, Patient Position: Sitting)   Pulse 70   Temp 98.4 °F (36.9 °C) (Oral)   Resp 16   Ht 170.2 cm (67\")   Wt 76.2 kg (168 lb)   SpO2 96%   BMI 26.31 kg/m²   Labs Reviewed - No data to display  Medications - No data to display  No radiology results for the last day                                         Medical Decision Making  Patient presented with above complaints, noted physical exam completed, patient has had multiple visits to our facility I was able to review the disposition note from dermatology appointment on 4/29/2024.  I spoke to Stephanie nurse with dermatology at [facility] who advised patient has had multiple antibiotic treatments including Keflex clindamycin and doxycycline she has had multiple creams as well as steroid treatment.  Physical presentation she has no evidence of cellulitis I have no concerns for sepsis she actually appears improved from the visit that I saw her in early April.  Advised to follow-up with dermatology early next week, patient gave verbal understanding, denied further " questions or complaints.    Chart review: 6/6/2024 outpatient visit with neurology related to orthostatic hypotension, dizziness      Note Disclaimer: At [facility], we believe that sharing information builds trust and better  relationships. You are receiving this note because you recently visited [facility]. It is possible you will see health information before a provider has talked with you about it. This kind of information can be easy to misunderstand. To help you fully understand what it means for your health, we urge you to discuss this note with your provider.       Part of this note may be an electronic transcription/translation of spoken language to printed text using the Dragon Dictation System.    Appropriate PPE worn during exam.    Problems Addressed:  Prurigo: acute illness or injury        Final diagnoses:   Prurigo       ED Disposition  ED Disposition       ED Disposition   Discharge    Condition   Stable    Comment   --               Arabella Hernandez, APRN  [address] [facility] [place] [facility]  [phone]               Medication List      No changes were made to your prescriptions during this visit.            Estelita Hardwick, APRN  06/14/24 2050

## 2024-06-14 NOTE — DISCHARGE INSTRUCTIONS
Continue to wash the area with antibacterial soap and water.  Continue to use the creams as well as hydroxyzine which was prescribed by dermatology    Cool compresses to the area    [facility] has been notified of your visit today and plan for upcoming outpatient appointment    Return to the ER for new or worsening symptoms

## 2024-08-20 ENCOUNTER — OFFICE VISIT (OUTPATIENT)
Dept: NEUROLOGY | Facility: CLINIC | Age: 49
End: 2024-08-20
Payer: MEDICAID

## 2024-08-20 DIAGNOSIS — Z53.21 PATIENT LEFT WITHOUT BEING SEEN: Primary | ICD-10-CM

## 2024-08-30 DIAGNOSIS — G43.119 INTRACTABLE MIGRAINE WITH AURA WITHOUT STATUS MIGRAINOSUS: ICD-10-CM

## 2024-09-03 RX ORDER — LEVETIRACETAM 750 MG/1
TABLET ORAL
Qty: 120 TABLET | Refills: 5 | Status: SHIPPED | OUTPATIENT
Start: 2024-09-03

## 2024-09-30 DIAGNOSIS — G40.319 GENERALIZED IDIOPATHIC EPILEPSY AND EPILEPTIC SYNDROMES, INTRACTABLE, WITHOUT STATUS EPILEPTICUS: ICD-10-CM

## 2024-10-02 RX ORDER — GABAPENTIN 800 MG/1
TABLET ORAL
Qty: 120 TABLET | Refills: 5 | Status: SHIPPED | OUTPATIENT
Start: 2024-10-02

## 2024-10-10 ENCOUNTER — TELEPHONE (OUTPATIENT)
Dept: NEUROLOGY | Facility: CLINIC | Age: 49
End: 2024-10-10
Payer: MEDICAID

## 2024-10-10 NOTE — TELEPHONE ENCOUNTER
DELETE AFTER REVIEWING: Send this encounter to the appropriate pool. See your Call Action Grid or Workflows for direction.    Caller: Xochitl Yao    Relationship to patient: Self    Best call back number: [phone]      Chief complaint: PT WAS SEEN AT [facility] 09/29    Type of visit: FOLLOW UP    Requested date: ASAP     If rescheduling, when is the original appointment:NA     Additional notes:PT STATES SHE NEEDS TO BE SOONER THAN NEXT AVAILABLE SCHEDULED FOR 05/13/25, PT WAS SEEN AT [facility] IN ER SOMETIME IN LATE SEPTEMBER,.    PLEASE ADVISE AND CONTACT PT IF SOONER APPT AVAILABLE WITH DR. SEIPEL OR IF ANOTHER PROVIDER WILL SEE PATIENT.        PLEASE REVIEW AND ADVISE

## 2024-11-14 ENCOUNTER — TELEPHONE (OUTPATIENT)
Dept: NEUROLOGY | Facility: CLINIC | Age: 49
End: 2024-11-14
Payer: MEDICAID

## 2024-11-14 NOTE — TELEPHONE ENCOUNTER
Provider: SEIPEL, JOSEPH, MD    Caller: Xochitl Yao    Relationship to Patient: Self    Phone Number: [phone]    Reason for Call: STATED SHE HAD A SEIZURE IN THE MIDDLE OF THE NIGHT TWO NIGHTS AGO.  STATED SHE WOKE UP BITING HER TONGUE & SUCKING ON IT.   STATED IT WAS SORE.   STATED SHE HAS NOT FELT RIGHT SINCE.  STATED SHE HAS BEEN DIZZY & OFF BALANCE & HANDS SHAKING.  STATED SHE THINKS SHE MAY BE DEHYDRATED.   STATED SHE IS DRINKING FLUIDS, BUT NEEDS TO DRINK MORE WATER.  CALLING TO SEE IF THIS IS A NORMAL SIDE EFFECT?    When was the patient last seen: 6-6-24    PLEASE CALL & ADVISED

## 2024-11-14 NOTE — TELEPHONE ENCOUNTER
Suggest see pcp  As she has hd problems with low blood pressure  and is not treated for seizure ,  and or go to er for evaluation if not feeling well and concerned

## 2024-11-23 DIAGNOSIS — G43.119 INTRACTABLE MIGRAINE WITH AURA WITHOUT STATUS MIGRAINOSUS: ICD-10-CM

## 2024-11-25 RX ORDER — SUMATRIPTAN 50 MG/1
TABLET, FILM COATED ORAL
Qty: 16 TABLET | Refills: 2 | Status: SHIPPED | OUTPATIENT
Start: 2024-11-25

## 2024-12-21 ENCOUNTER — HOSPITAL ENCOUNTER (OUTPATIENT)
Facility: HOSPITAL | Age: 49
Discharge: HOME | End: 2024-12-21
Attending: EMERGENCY MEDICINE | Admitting: EMERGENCY MEDICINE
Payer: MEDICAID

## 2024-12-21 VITALS
SYSTOLIC BLOOD PRESSURE: 108 MMHG | HEART RATE: 76 BPM | WEIGHT: 167 LBS | HEIGHT: 67 IN | OXYGEN SATURATION: 94 % | TEMPERATURE: 98 F | BODY MASS INDEX: 26.21 KG/M2 | DIASTOLIC BLOOD PRESSURE: 74 MMHG | RESPIRATION RATE: 16 BRPM

## 2024-12-21 DIAGNOSIS — J06.9 ACUTE URI: Primary | ICD-10-CM

## 2024-12-21 LAB — STREP A PCR: NOT DETECTED

## 2024-12-21 PROCEDURE — G0463 HOSPITAL OUTPT CLINIC VISIT: HCPCS

## 2024-12-21 PROCEDURE — 87651 STREP A DNA AMP PROBE: CPT

## 2024-12-21 RX ORDER — PREDNISONE 20 MG/1
40 TABLET ORAL DAILY
Qty: 10 TABLET | Refills: 0 | Status: SHIPPED | OUTPATIENT
Start: 2024-12-21 | End: 2024-12-26

## 2024-12-21 RX ORDER — DOXYCYCLINE 100 MG/1
100 CAPSULE ORAL 2 TIMES DAILY
Qty: 10 CAPSULE | Refills: 0 | Status: SHIPPED | OUTPATIENT
Start: 2024-12-21 | End: 2024-12-26

## 2024-12-21 NOTE — Clinical Note
[facility] [place] [address] [place] [address] [place] [address]  Phone: [phone]    Xochitl Yao was seen and treated in our emergency department on 12/21/2024.  She may return to work on 12/23/2024.  May return sooner if feeling well       Thank you for choosing [facility].    Medina Deal, APRN

## 2024-12-21 NOTE — DISCHARGE INSTRUCTIONS
Increase hydration    Take antibiotics as prescribed    Take steroids as prescribed.    Use your nebulizer as prescribed.    Continue to treat with Tylenol and ibuprofen as needed for pain or fever.    Follow-up with primary care if symptoms persist.    Return to the ER with any new or worsening symptoms.

## 2024-12-21 NOTE — FSED PROVIDER NOTE
Subjective   History of Present Illness  Patient is a 49-year-old female with history of peripheral neuropathy and seizures who presents today with bilateral ear pain and sore throat.  She said the ear pain began a month ago.  She denies fever, nausea, vomiting, diarrhea, chest pain, shortness of air.        Review of Systems   HENT:  Positive for ear pain and sore throat.    All other systems reviewed and are negative.      Past Medical History:   Diagnosis Date    Peripheral neuropathy     Seizures        No Known Allergies    History reviewed. No pertinent surgical history.    Family History   Problem Relation Age of Onset    Migraines Mother     Dementia Mother        Social History     Socioeconomic History    Marital status:    Tobacco Use    Smoking status: Every Day     Current packs/day: 1.50     Average packs/day: 1.5 packs/day for 30.0 years (45.0 ttl pk-yrs)     Types: Cigarettes    Smokeless tobacco: Never   Vaping Use    Vaping status: Never Used   Substance and Sexual Activity    Alcohol use: Not Currently    Drug use: Yes     Types: Marijuana     Comment: 3 times daily    Sexual activity: Defer           Objective   Physical Exam  Vitals and nursing note reviewed.   Constitutional:       Appearance: Normal appearance.   HENT:      Head: Normocephalic.      Right Ear: Tympanic membrane and ear canal normal.      Left Ear: Tympanic membrane and ear canal normal.      Nose: Nose normal.      Mouth/Throat:      Mouth: Mucous membranes are moist.      Pharynx: Posterior oropharyngeal erythema present. No oropharyngeal exudate.   Eyes:      Pupils: Pupils are equal, round, and reactive to light.   Cardiovascular:      Rate and Rhythm: Normal rate and regular rhythm.      Pulses: Normal pulses.      Heart sounds: Normal heart sounds.   Pulmonary:      Effort: Pulmonary effort is normal.      Breath sounds: Wheezing present.   Musculoskeletal:         General: Normal range of motion.      Cervical  back: Normal range of motion.   Skin:     General: Skin is warm.      Capillary Refill: Capillary refill takes less than 2 seconds.   Neurological:      General: No focal deficit present.      Mental Status: She is alert.   Psychiatric:         Mood and Affect: Mood normal.         Behavior: Behavior normal.         Procedures           ED Course  ED Course as of 12/21/24 1243   Sat Dec 21, 2024   1232 STREP A PCR: Not Detected [CW]      ED Course User Index  [CW] Medina Deal, ALEYDA                                           Medical Decision Making  Patient is a 49-year-old female with history of peripheral neuropathy, COPD and seizures who presents today with bilateral ear pain and sore throat.  She said the ear pain began a month ago.  She reports she finished azithromycin but the chest congestion and ear fullness has become worse.  She denies fever, nausea, vomiting, diarrhea, chest pain, shortness of air.    Upon exam patient is awake and alert, nontoxic-appearing, appears in no acute distress, and is answering questions appropriately.  Patient does have bilateral wheezing.  Heart is normal rate and rhythm.  Mucous membranes are moist, oropharynx does have some erythema but is free of exudate, lesions, uvula is midline, tonsils are normal.  I was able to visualize bilateral TMs and they were normal.  Strep swab was ordered and was negative.  Will cover her for pneumonia with Augmentin, and give her some prednisone.  I have advised her to continue to use over-the-counter medication as needed for symptom management, to increase her hydration, and to follow-up with her primary care in 3 to 5 days if symptoms persist.  I have advised her to return to the ER with any new or worsening symptoms.        Problems Addressed:  Acute URI: complicated acute illness or injury    Amount and/or Complexity of Data Reviewed  Labs:  Decision-making details documented in ED Course.    Risk  Prescription drug  management.        Final diagnoses:   Acute URI       ED Disposition  ED Disposition       ED Disposition   Discharge    Condition   Stable    Comment   --               Darinel Hernandezgha, APRN  [address] [facility] [place] [address] [phone]    Schedule an appointment as soon as possible for a visit            Medication List        New Prescriptions      amoxicillin-clavulanate 875-125 MG per tablet  Commonly known as: AUGMENTIN  Take 1 tablet by mouth 2 (Two) Times a Day for 5 days.     doxycycline 100 MG capsule  Commonly known as: MONODOX  Take 1 capsule by mouth 2 (Two) Times a Day for 5 days.     predniSONE 20 MG tablet  Commonly known as: DELTASONE  Take 2 tablets by mouth Daily for 5 days.               Where to Get Your Medications        These medications were sent to [id number] - [place], [place] - [address] - [phone]  - [phone] [facility] [place] [address]      Phone: [phone]   amoxicillin-clavulanate 875-125 MG per tablet  doxycycline 100 MG capsule  predniSONE 20 MG tablet

## 2025-02-03 ENCOUNTER — TELEPHONE (OUTPATIENT)
Dept: NEUROLOGY | Facility: CLINIC | Age: 50
End: 2025-02-03
Payer: MEDICAID

## 2025-02-03 ENCOUNTER — TELEPHONE (OUTPATIENT)
Dept: SLEEP MEDICINE | Facility: CLINIC | Age: 50
End: 2025-02-03
Payer: MEDICAID

## 2025-02-03 NOTE — TELEPHONE ENCOUNTER
Pt left  requesting return call regarding referral from neuro.    Due to multiple HST cancellations, we previously advised pt we were unable to reschedule her.    Her last face to face with [facility] was in June 2024, and she needs a visit with referring provider within 6 months of having a sleep study.    I returned her call and advised of above information.  She reports having an appt with neuro in May.  I suggested she call to see if her appt could be moved up.  We are unable to schedule until she is seen by them again, and we receive a new order.

## 2025-02-03 NOTE — TELEPHONE ENCOUNTER
Provider: ALEYDA JACOBSON    Caller: Xochitl Yao    Relationship to Patient: Self    Phone Number: [phone]      Reason for Call: PT STATES SHE HAS NOT HAD SLEEP STUDY DONE, SCHEDULING WILL NOT R/S AGAIN UNTIL PT SEES ALEYDA JACOBSON OR DR. SEIPEL, THEY ADVISED PT NEEDED TO BE SEEN WITHIN 6 MONTHS, PT LAST SEEN BY [facility] ON 06/06/24, NEXT AVAILABLE APPTS WITH BOTH PROVIDERS IS MAY 2025.    When was the patient last seen: 06/06/24      PLEASE REVIEW AND ADVISE

## 2025-02-08 ENCOUNTER — HOSPITAL ENCOUNTER (EMERGENCY)
Facility: HOSPITAL | Age: 50
Discharge: HOME | End: 2025-02-08
Attending: EMERGENCY MEDICINE
Payer: MEDICAID

## 2025-02-08 ENCOUNTER — APPOINTMENT (OUTPATIENT)
Dept: CT IMAGING | Facility: HOSPITAL | Age: 50
End: 2025-02-08
Payer: MEDICAID

## 2025-02-08 VITALS
DIASTOLIC BLOOD PRESSURE: 78 MMHG | TEMPERATURE: 97.5 F | WEIGHT: 175 LBS | HEIGHT: 67 IN | OXYGEN SATURATION: 99 % | HEART RATE: 86 BPM | RESPIRATION RATE: 16 BRPM | BODY MASS INDEX: 27.47 KG/M2 | SYSTOLIC BLOOD PRESSURE: 118 MMHG

## 2025-02-08 DIAGNOSIS — R10.13 EPIGASTRIC PAIN: Primary | ICD-10-CM

## 2025-02-08 LAB
ALBUMIN SERPL-MCNC: 4.8 G/DL (ref 3.5–5.2)
ALBUMIN/GLOB SERPL: 1.4 G/DL
ALP SERPL-CCNC: 145 U/L (ref 39–117)
ALT SERPL W P-5'-P-CCNC: 11 U/L (ref 1–33)
ANION GAP SERPL CALCULATED.3IONS-SCNC: 9.5 MMOL/L (ref 5–15)
AST SERPL-CCNC: 19 U/L (ref 1–32)
BASOPHILS # BLD AUTO: 0.07 10*3/MM3 (ref 0–0.2)
BASOPHILS NFR BLD AUTO: 0.6 % (ref 0–1.5)
BILIRUB SERPL-MCNC: 0.2 MG/DL (ref 0–1.2)
BILIRUB UR QL STRIP: NEGATIVE
BUN SERPL-MCNC: 19 MG/DL (ref 6–20)
BUN/CREAT SERPL: 18.6 (ref 7–25)
CALCIUM SPEC-SCNC: 9.9 MG/DL (ref 8.6–10.5)
CHLORIDE SERPL-SCNC: 114 MMOL/L (ref 98–107)
CLARITY UR: CLEAR
CO2 SERPL-SCNC: 18.5 MMOL/L (ref 22–29)
COLOR UR: YELLOW
CREAT SERPL-MCNC: 1.02 MG/DL (ref 0.57–1)
DEPRECATED RDW RBC AUTO: 46 FL (ref 37–54)
EGFRCR SERPLBLD CKD-EPI 2021: 66.3 ML/MIN/1.73
EOSINOPHIL # BLD AUTO: 0.14 10*3/MM3 (ref 0–0.4)
EOSINOPHIL NFR BLD AUTO: 1.1 % (ref 0.3–6.2)
ERYTHROCYTE [DISTWIDTH] IN BLOOD BY AUTOMATED COUNT: 13.8 % (ref 12.3–15.4)
FLUAV SUBTYP SPEC NAA+PROBE: NOT DETECTED
FLUBV RNA ISLT QL NAA+PROBE: NOT DETECTED
GLOBULIN UR ELPH-MCNC: 3.5 GM/DL
GLUCOSE SERPL-MCNC: 101 MG/DL (ref 65–99)
GLUCOSE UR STRIP-MCNC: NEGATIVE MG/DL
HCT VFR BLD AUTO: 44.4 % (ref 34–46.6)
HGB BLD-MCNC: 14.5 G/DL (ref 12–15.9)
HGB UR QL STRIP.AUTO: NEGATIVE
IMM GRANULOCYTES # BLD AUTO: 0.02 10*3/MM3 (ref 0–0.05)
IMM GRANULOCYTES NFR BLD AUTO: 0.2 % (ref 0–0.5)
KETONES UR QL STRIP: NEGATIVE
LEUKOCYTE ESTERASE UR QL STRIP.AUTO: NEGATIVE
LIPASE SERPL-CCNC: 32 U/L (ref 13–60)
LYMPHOCYTES # BLD AUTO: 3.27 10*3/MM3 (ref 0.7–3.1)
LYMPHOCYTES NFR BLD AUTO: 26.5 % (ref 19.6–45.3)
MCH RBC QN AUTO: 29.4 PG (ref 26.6–33)
MCHC RBC AUTO-ENTMCNC: 32.7 G/DL (ref 31.5–35.7)
MCV RBC AUTO: 90.1 FL (ref 79–97)
MONOCYTES # BLD AUTO: 0.51 10*3/MM3 (ref 0.1–0.9)
MONOCYTES NFR BLD AUTO: 4.1 % (ref 5–12)
NEUTROPHILS NFR BLD AUTO: 67.5 % (ref 42.7–76)
NEUTROPHILS NFR BLD AUTO: 8.33 10*3/MM3 (ref 1.7–7)
NITRITE UR QL STRIP: NEGATIVE
PH UR STRIP.AUTO: 6 [PH] (ref 5–8)
PLATELET # BLD AUTO: 239 10*3/MM3 (ref 140–450)
PMV BLD AUTO: 11.5 FL (ref 6–12)
POTASSIUM SERPL-SCNC: 3.5 MMOL/L (ref 3.5–5.2)
PROT SERPL-MCNC: 8.3 G/DL (ref 6–8.5)
PROT UR QL STRIP: ABNORMAL
RBC # BLD AUTO: 4.93 10*6/MM3 (ref 3.77–5.28)
SARS-COV-2 RNA RESP QL NAA+PROBE: NOT DETECTED
SODIUM SERPL-SCNC: 142 MMOL/L (ref 136–145)
SP GR UR STRIP: 1.01 (ref 1–1.03)
UROBILINOGEN UR QL STRIP: ABNORMAL
WBC NRBC COR # BLD AUTO: 12.34 10*3/MM3 (ref 3.4–10.8)

## 2025-02-08 PROCEDURE — 96374 THER/PROPH/DIAG INJ IV PUSH: CPT

## 2025-02-08 PROCEDURE — 99285 EMERGENCY DEPT VISIT HI MDM: CPT

## 2025-02-08 PROCEDURE — 81003 URINALYSIS AUTO W/O SCOPE: CPT | Performed by: EMERGENCY MEDICINE

## 2025-02-08 PROCEDURE — 80053 COMPREHEN METABOLIC PANEL: CPT | Performed by: NURSE PRACTITIONER

## 2025-02-08 PROCEDURE — 83690 ASSAY OF LIPASE: CPT | Performed by: NURSE PRACTITIONER

## 2025-02-08 PROCEDURE — 25010000002 ONDANSETRON PER 1 MG: Performed by: NURSE PRACTITIONER

## 2025-02-08 PROCEDURE — 87636 SARSCOV2 & INF A&B AMP PRB: CPT | Performed by: EMERGENCY MEDICINE

## 2025-02-08 PROCEDURE — 74177 CT ABD & PELVIS W/CONTRAST: CPT

## 2025-02-08 PROCEDURE — 85025 COMPLETE CBC W/AUTO DIFF WBC: CPT | Performed by: NURSE PRACTITIONER

## 2025-02-08 PROCEDURE — 25510000001 IOPAMIDOL PER 1 ML: Performed by: EMERGENCY MEDICINE

## 2025-02-08 RX ORDER — IOPAMIDOL 755 MG/ML
100 INJECTION, SOLUTION INTRAVASCULAR
Status: COMPLETED | OUTPATIENT
Start: 2025-02-08 | End: 2025-02-08

## 2025-02-08 RX ORDER — SODIUM CHLORIDE 0.9 % (FLUSH) 0.9 %
10 SYRINGE (ML) INJECTION AS NEEDED
Status: DISCONTINUED | OUTPATIENT
Start: 2025-02-08 | End: 2025-02-08 | Stop reason: HOSPADM

## 2025-02-08 RX ORDER — ONDANSETRON 4 MG/1
4 TABLET, ORALLY DISINTEGRATING ORAL EVERY 6 HOURS PRN
Qty: 28 TABLET | Refills: 0 | Status: SHIPPED | OUTPATIENT
Start: 2025-02-08 | End: 2025-02-15

## 2025-02-08 RX ORDER — ONDANSETRON 2 MG/ML
4 INJECTION INTRAMUSCULAR; INTRAVENOUS ONCE
Status: COMPLETED | OUTPATIENT
Start: 2025-02-08 | End: 2025-02-08

## 2025-02-08 RX ADMIN — IOPAMIDOL 100 ML: 755 INJECTION, SOLUTION INTRAVENOUS at 10:46

## 2025-02-08 RX ADMIN — ONDANSETRON 4 MG: 2 INJECTION INTRAMUSCULAR; INTRAVENOUS at 10:53

## 2025-02-08 NOTE — FSED PROVIDER NOTE
Subjective   History of Present Illness  The patient is a 50-year-old female who presents the ER with vomiting, diarrhea, black stools that started yesterday.  Patient denies any fevers.     History provided by:  Patient   used: No        Review of Systems   Gastrointestinal:  Positive for abdominal pain, diarrhea and vomiting.       Past Medical History:   Diagnosis Date    Peripheral neuropathy     Seizures        No Known Allergies    No past surgical history on file.    Family History   Problem Relation Age of Onset    Migraines Mother     Dementia Mother        Social History     Socioeconomic History    Marital status:    Tobacco Use    Smoking status: Every Day     Current packs/day: 1.50     Average packs/day: 1.5 packs/day for 30.0 years (45.0 ttl pk-yrs)     Types: Cigarettes    Smokeless tobacco: Never   Vaping Use    Vaping status: Never Used   Substance and Sexual Activity    Alcohol use: Not Currently    Drug use: Yes     Types: Marijuana     Comment: 3 times daily    Sexual activity: Defer           Objective   Physical Exam  Vitals and nursing note reviewed.   Constitutional:       Appearance: Normal appearance.   HENT:      Head: Normocephalic.      Right Ear: Tympanic membrane and ear canal normal.      Left Ear: Tympanic membrane and ear canal normal.      Nose: Nose normal.      Mouth/Throat:      Lips: Pink.      Mouth: Mucous membranes are moist.      Pharynx: Oropharynx is clear. Uvula midline.   Pulmonary:      Effort: Pulmonary effort is normal.      Breath sounds: Normal breath sounds and air entry.   Abdominal:      General: Bowel sounds are normal.      Palpations: Abdomen is soft.      Tenderness: There is generalized abdominal tenderness and tenderness in the epigastric area.       Musculoskeletal:         General: Normal range of motion.      Cervical back: Full passive range of motion without pain, normal range of motion and neck supple.   Skin:      General: Skin is warm and dry.   Neurological:      General: No focal deficit present.      Mental Status: She is alert and oriented to person, place, and time.   Psychiatric:         Mood and Affect: Mood normal.         Behavior: Behavior normal. Behavior is cooperative.         Procedures           ED Course  ED Course as of 02/08/25 1114   Sat Feb 08, 2025   0927 Patient refuses rectal exam.  Advised patient if she is having black-colored stools we should do a rectal to make sure she does not have blood.  Patient's refusing this at this time. [DS]   0950 WBC(!): 12.34 [DS]   0950 Hemoglobin: 14.5 [DS]   0950 Hematocrit: 44.4 [DS]   1012 Lipase: 32 [DS]   1027 Glucose(!): 101 [DS]   1027 BUN: 19 [DS]   1027 Creatinine(!): 1.02 [DS]   1027 Sodium: 142 [DS]   1027 Potassium: 3.5 [DS]   1104 CT ABDOMEN PELVIS W CONTRAST     Date of Exam: 2/8/2025 10:35 AM EST     Indication: Abdominal pain,, vomiting, black stools..     Comparison: CT abdomen pelvis 9/22/2016     Technique: Axial CT images were obtained of the abdomen and pelvis following the uneventful intravenous administration of iodinated contrast. Sagittal and coronal reconstructions were performed.  Automated exposure control and iterative reconstruction   methods were used.           Findings:  LUNG BASES: There is stable appearance to a subpleural nodule at the medial right lung base measuring 4 mm (image 1 of series 2) without follow-up required.     LIVER:  Unremarkable parenchyma without focal lesion.  BILIARY/GALLBLADDER:  Unremarkable  SPLEEN:  Unremarkable  PANCREAS:  Unremarkable  ADRENAL:  Unremarkable  KIDNEYS:  Unremarkable parenchyma with no solid mass identified. No obstruction.  No calculus identified.  GASTROINTESTINAL/MESENTERY:  No evidence of obstruction nor inflammation.  The appendix is normal in caliber.  MESENTERIC VESSELS:  Patent.  AORTA/IVC:  Normal caliber.     RETROPERITONEUM/LYMPH NODES:  Unremarkable     REPRODUCTIVE: Surgically  absent.  BLADDER:  Unremarkable     OSSEUS STRUCTURES:  Typical for age with no acute process identified.        IMPRESSION:  Impression:     . No acute findings in the abdomen or pelvis.   [DS]      ED Course User Index  [DS] Leanne Middleton APRN                                           Medical Decision Making  The patient is a 50-year-old female who presents the ER with vomiting, diarrhea, black stools that started yesterday.  Patient denies any fevers.    Presentation consistent with acute epigastric abdominal pain.  Patient reports that she has had a history of diverticulitis. Abdominal exam without peritoneal signs. No evidence of acute abdomen at this time. Well appearing. Given work up have low suspicion for acute hepatobiliary disease (including acute cholecystitis or cholangitis), upper GI bleed, acute pancreatitis, gastric perforation, acute infectious processes (pneumonia, hepatitis, pyelonephritis), atypical appendicitis, vascular catastrophe, bowel obstruction or viscus perforation, or acute coronary syndrome. Presentation not consistent with other acute, emergent causes of abdominal pain at this time.  Patient refuses to have rectal exam so unable to send Hemoccult stool.  Advised patient that if she continues to have black-colored stool she needs to follow-up with her GI physician.  Will send in Zofran at this time for nausea and vomiting.        Problems Addressed:  Epigastric pain: complicated acute illness or injury    Amount and/or Complexity of Data Reviewed  Labs: ordered. Decision-making details documented in ED Course.  Radiology: ordered. Decision-making details documented in ED Course.    Risk  Prescription drug management.        Final diagnoses:   Epigastric pain       ED Disposition  ED Disposition       ED Disposition   Discharge    Condition   Stable    Comment   --               Arabella Hernandez, APRN  [address] [facility] [place] [facility]  [phone]    Schedule an  appointment as soon as possible for a visit in 1 week  As needed, If symptoms worsen    [facility]  [address] [facility] [address] [phone]  Schedule an appointment as soon as possible for a visit in 1 week  Call for follow-up appointment.         Medication List        New Prescriptions      ondansetron ODT 4 MG disintegrating tablet  Commonly known as: ZOFRAN-ODT  Place 1 tablet on the tongue Every 6 (Six) Hours As Needed for Nausea or Vomiting for up to 7 days.               Where to Get Your Medications        These medications were sent to [facility][id number] - [place], [place] - [address] [place] [address] - [phone]  - [phone] [address] [place] [address]      Hours: 24-hours Phone: [phone]   ondansetron ODT 4 MG disintegrating tablet

## 2025-02-08 NOTE — ED NOTES
History of diverticulitis. History of suboxone and she feels like her stools changed with the medication. States that her stool looks black yesterday and today but she attributes this to constipation.

## 2025-02-08 NOTE — DISCHARGE INSTRUCTIONS
Call for a follow up appointment with your primary care for further evaluation and treatment.      If you continue to have black stools you need to follow-up with your GI specialist.    Zofran as needed for nausea vomiting.    Tylenol/Motrin as needed for pain/fevers    Make sure patient is drinking plenty of fluids.    Return for any new or worsening symptoms.      Voice recognition transcription technology was used for documentation on this chart.  Result there may be some typos and/or introduced into the chart that were overlooked during editing reviewing.

## 2025-02-24 NOTE — TELEPHONE ENCOUNTER
Insurance requires the Office Visit be within last 6 months to authorize a sleep study. So she will have to be seen in office again to have this evaluated and re-ordered  HUB ok to relay

## 2025-03-07 DIAGNOSIS — G43.119 INTRACTABLE MIGRAINE WITH AURA WITHOUT STATUS MIGRAINOSUS: ICD-10-CM

## 2025-03-07 RX ORDER — LEVETIRACETAM 750 MG/1
TABLET ORAL
Qty: 120 TABLET | Refills: 2 | Status: SHIPPED | OUTPATIENT
Start: 2025-03-07

## 2025-03-31 ENCOUNTER — TELEPHONE (OUTPATIENT)
Dept: NEUROLOGY | Facility: CLINIC | Age: 50
End: 2025-03-31
Payer: MEDICAID

## 2025-03-31 DIAGNOSIS — G43.109 MIGRAINE WITH AURA AND WITHOUT STATUS MIGRAINOSUS, NOT INTRACTABLE: Primary | ICD-10-CM

## 2025-03-31 DIAGNOSIS — G40.909 SEIZURE SYNDROME: ICD-10-CM

## 2025-03-31 DIAGNOSIS — G40.319 GENERALIZED IDIOPATHIC EPILEPSY AND EPILEPTIC SYNDROMES, INTRACTABLE, WITHOUT STATUS EPILEPTICUS: ICD-10-CM

## 2025-03-31 NOTE — TELEPHONE ENCOUNTER
Caller: JohnathankaseyStephanieah GILDA     Relationship: SELF    Best call back number: [phone]     What is your medical concern? SEIZURES     How long has this issue been going on? THE LAST TWO NIGHTS    Is your provider already aware of this issue? OF THE SEIZURES YES    Have you been treated for this issue? PT IS TAKING HER MEDS REGULARLY    SHE STATED YESTERDAY AND TODAY SHE HAS WOKEN AROUND 5 AM AND HAS NOT FELT RIGHT. SHE'S HUNCHED OVER, HER BACK IS IN PAIN, SOMETHING FEELS OFF. SHE THINKS ITS SEIZURES IN HER SLEEP BUT SHE IS  UNSURE.     PLEASE REVIEW AND ADVISE  THANK YOU

## 2025-03-31 NOTE — TELEPHONE ENCOUNTER
If she is having seizures go to to the ER   Keep appointment in may as scheduled   Will order a four hour EEG to evaluate for possible seizures  She is on keppra and gabapentin but apparently this is not helping with the spells   It is noted in the past 5 years she has no show or cx appts about 7 times and seen in office only two or three times.

## 2025-04-01 ENCOUNTER — TELEPHONE (OUTPATIENT)
Dept: NEUROLOGY | Facility: CLINIC | Age: 50
End: 2025-04-01
Payer: MEDICAID

## 2025-04-01 NOTE — TELEPHONE ENCOUNTER
"Called patient and spoke to her. She declined going to ER and stated that \"the meds help\" so she didn't need to go to ER. I stressed several times on call that she needs to do 4 hr EEG when they call her to schedule and also stressed that she must keep 5/13/25 appt with Dr. Seipel because these episodes have not been properly worked up here or addressed. She verbalized she would do both.   "

## 2025-04-02 RX ORDER — GABAPENTIN 800 MG/1
800 TABLET ORAL EVERY 6 HOURS
Qty: 120 TABLET | Refills: 0 | Status: SHIPPED | OUTPATIENT
Start: 2025-04-02

## 2025-04-05 ENCOUNTER — APPOINTMENT (OUTPATIENT)
Dept: GENERAL RADIOLOGY | Facility: HOSPITAL | Age: 50
End: 2025-04-05
Payer: MEDICAID

## 2025-04-05 ENCOUNTER — HOSPITAL ENCOUNTER (INPATIENT)
Facility: HOSPITAL | Age: 50
LOS: 2 days | Discharge: HOME | End: 2025-04-07
Attending: INTERNAL MEDICINE | Admitting: INTERNAL MEDICINE
Payer: MEDICAID

## 2025-04-05 DIAGNOSIS — R82.5 POSITIVE URINE DRUG SCREEN: ICD-10-CM

## 2025-04-05 DIAGNOSIS — E87.6 HYPOKALEMIA: ICD-10-CM

## 2025-04-05 DIAGNOSIS — R53.1 WEAKNESS: Primary | ICD-10-CM

## 2025-04-05 PROBLEM — L20.9 ATOPIC DERMATITIS: Status: ACTIVE | Noted: 2025-03-11

## 2025-04-05 PROBLEM — G89.29 CHRONIC MYOFASCIAL PAIN: Status: ACTIVE | Noted: 2022-10-07

## 2025-04-05 PROBLEM — G89.4 CHRONIC PAIN DISORDER: Status: ACTIVE | Noted: 2025-02-03

## 2025-04-05 LAB
ALBUMIN SERPL-MCNC: 3.7 G/DL (ref 3.5–5.2)
ALBUMIN SERPL-MCNC: 4.1 G/DL (ref 3.5–5.2)
ALBUMIN/GLOB SERPL: 1.3 G/DL
ALBUMIN/GLOB SERPL: 1.3 G/DL
ALP SERPL-CCNC: 154 U/L (ref 39–117)
ALP SERPL-CCNC: 169 U/L (ref 39–117)
ALT SERPL W P-5'-P-CCNC: 26 U/L (ref 1–33)
ALT SERPL W P-5'-P-CCNC: 29 U/L (ref 1–33)
AMPHET+METHAMPHET UR QL: POSITIVE
AMPHETAMINES UR QL: NEGATIVE
ANION GAP SERPL CALCULATED.3IONS-SCNC: 14.1 MMOL/L (ref 5–15)
ANION GAP SERPL CALCULATED.3IONS-SCNC: 14.9 MMOL/L (ref 5–15)
AST SERPL-CCNC: 65 U/L (ref 1–32)
AST SERPL-CCNC: 75 U/L (ref 1–32)
B PARAPERT DNA SPEC QL NAA+PROBE: NOT DETECTED
B PERT DNA SPEC QL NAA+PROBE: NOT DETECTED
BARBITURATES UR QL SCN: NEGATIVE
BASOPHILS # BLD AUTO: 0.07 10*3/MM3 (ref 0–0.2)
BASOPHILS # BLD AUTO: 0.09 10*3/MM3 (ref 0–0.2)
BASOPHILS NFR BLD AUTO: 0.6 % (ref 0–1.5)
BASOPHILS NFR BLD AUTO: 1 % (ref 0–1.5)
BENZODIAZ UR QL SCN: NEGATIVE
BILIRUB SERPL-MCNC: 0.3 MG/DL (ref 0–1.2)
BILIRUB SERPL-MCNC: 0.3 MG/DL (ref 0–1.2)
BUN SERPL-MCNC: 14 MG/DL (ref 6–20)
BUN SERPL-MCNC: 15 MG/DL (ref 6–20)
BUN/CREAT SERPL: 12.5 (ref 7–25)
BUN/CREAT SERPL: 12.5 (ref 7–25)
BUPRENORPHINE SERPL-MCNC: POSITIVE NG/ML
C PNEUM DNA NPH QL NAA+NON-PROBE: NOT DETECTED
CA-I SERPL ISE-MCNC: 1.12 MMOL/L (ref 1.15–1.3)
CALCIUM SPEC-SCNC: 8.9 MG/DL (ref 8.6–10.5)
CALCIUM SPEC-SCNC: 9.5 MG/DL (ref 8.6–10.5)
CANNABINOIDS SERPL QL: POSITIVE
CHLORIDE SERPL-SCNC: 103 MMOL/L (ref 98–107)
CHLORIDE SERPL-SCNC: 99 MMOL/L (ref 98–107)
CO2 SERPL-SCNC: 27.1 MMOL/L (ref 22–29)
CO2 SERPL-SCNC: 27.9 MMOL/L (ref 22–29)
COCAINE UR QL: NEGATIVE
CORTIS SERPL-MCNC: 28.2 MCG/DL
CREAT SERPL-MCNC: 1.12 MG/DL (ref 0.57–1)
CREAT SERPL-MCNC: 1.2 MG/DL (ref 0.57–1)
DEPRECATED RDW RBC AUTO: 46 FL (ref 37–54)
DEPRECATED RDW RBC AUTO: 47 FL (ref 37–54)
EGFRCR SERPLBLD CKD-EPI 2021: 54.6 ML/MIN/1.73
EGFRCR SERPLBLD CKD-EPI 2021: 59.3 ML/MIN/1.73
EOSINOPHIL # BLD AUTO: 0.01 10*3/MM3 (ref 0–0.4)
EOSINOPHIL # BLD AUTO: 0.54 10*3/MM3 (ref 0–0.4)
EOSINOPHIL NFR BLD AUTO: 0.1 % (ref 0.3–6.2)
EOSINOPHIL NFR BLD AUTO: 3.9 % (ref 0.3–6.2)
ERYTHROCYTE [DISTWIDTH] IN BLOOD BY AUTOMATED COUNT: 14.2 % (ref 12.3–15.4)
ERYTHROCYTE [DISTWIDTH] IN BLOOD BY AUTOMATED COUNT: 14.2 % (ref 12.3–15.4)
FLUAV SUBTYP SPEC NAA+PROBE: NOT DETECTED
FLUBV RNA ISLT QL NAA+PROBE: NOT DETECTED
GLOBULIN UR ELPH-MCNC: 2.8 GM/DL
GLOBULIN UR ELPH-MCNC: 3.2 GM/DL
GLUCOSE SERPL-MCNC: 103 MG/DL (ref 65–99)
GLUCOSE SERPL-MCNC: 104 MG/DL (ref 65–99)
GLUCOSE UR STRIP-MCNC: NEGATIVE MG/DL
HADV DNA SPEC NAA+PROBE: NOT DETECTED
HBA1C MFR BLD: 5.2 % (ref 4.8–5.6)
HCOV 229E RNA SPEC QL NAA+PROBE: NOT DETECTED
HCOV HKU1 RNA SPEC QL NAA+PROBE: NOT DETECTED
HCOV NL63 RNA SPEC QL NAA+PROBE: NOT DETECTED
HCOV OC43 RNA SPEC QL NAA+PROBE: NOT DETECTED
HCT VFR BLD AUTO: 41.7 % (ref 34–46.6)
HCT VFR BLD AUTO: 42.7 % (ref 34–46.6)
HGB BLD-MCNC: 13.8 G/DL (ref 12–15.9)
HGB BLD-MCNC: 14.5 G/DL (ref 12–15.9)
HMPV RNA NPH QL NAA+NON-PROBE: NOT DETECTED
HOLD SPECIMEN: NORMAL
HPIV1 RNA ISLT QL NAA+PROBE: NOT DETECTED
HPIV2 RNA SPEC QL NAA+PROBE: NOT DETECTED
HPIV3 RNA NPH QL NAA+PROBE: NOT DETECTED
HPIV4 P GENE NPH QL NAA+PROBE: NOT DETECTED
IMM GRANULOCYTES # BLD AUTO: 0.03 10*3/MM3 (ref 0–0.05)
IMM GRANULOCYTES # BLD AUTO: 0.06 10*3/MM3 (ref 0–0.05)
IMM GRANULOCYTES NFR BLD AUTO: 0.4 % (ref 0–0.5)
IMM GRANULOCYTES NFR BLD AUTO: 0.4 % (ref 0–0.5)
KETONES UR QL STRIP: NEGATIVE
L PNEUMO1 AG UR QL IA: NEGATIVE
LYMPHOCYTES # BLD AUTO: 1.17 10*3/MM3 (ref 0.7–3.1)
LYMPHOCYTES # BLD AUTO: 2.8 10*3/MM3 (ref 0.7–3.1)
LYMPHOCYTES NFR BLD AUTO: 15.9 % (ref 19.6–45.3)
LYMPHOCYTES NFR BLD AUTO: 20.1 % (ref 19.6–45.3)
M PNEUMO IGG SER IA-ACNC: NOT DETECTED
MAGNESIUM SERPL-MCNC: 2.3 MG/DL (ref 1.6–2.6)
MAGNESIUM SERPL-MCNC: 2.3 MG/DL (ref 1.6–2.6)
MCH RBC QN AUTO: 29.9 PG (ref 26.6–33)
MCH RBC QN AUTO: 30.3 PG (ref 26.6–33)
MCHC RBC AUTO-ENTMCNC: 33.1 G/DL (ref 31.5–35.7)
MCHC RBC AUTO-ENTMCNC: 34 G/DL (ref 31.5–35.7)
MCV RBC AUTO: 89.3 FL (ref 79–97)
MCV RBC AUTO: 90.5 FL (ref 79–97)
METHADONE UR QL SCN: NEGATIVE
MONOCYTES # BLD AUTO: 0.07 10*3/MM3 (ref 0.1–0.9)
MONOCYTES # BLD AUTO: 1.08 10*3/MM3 (ref 0.1–0.9)
MONOCYTES NFR BLD AUTO: 1 % (ref 5–12)
MONOCYTES NFR BLD AUTO: 7.8 % (ref 5–12)
MRSA DNA SPEC QL NAA+PROBE: NORMAL
NEUTROPHILS NFR BLD AUTO: 6 10*3/MM3 (ref 1.7–7)
NEUTROPHILS NFR BLD AUTO: 67.2 % (ref 42.7–76)
NEUTROPHILS NFR BLD AUTO: 81.6 % (ref 42.7–76)
NEUTROPHILS NFR BLD AUTO: 9.33 10*3/MM3 (ref 1.7–7)
NRBC BLD AUTO-RTO: 0 /100 WBC (ref 0–0.2)
NRBC BLD AUTO-RTO: 0 /100 WBC (ref 0–0.2)
OPIATES UR QL: NEGATIVE
OSMOLALITY UR: 239 MOSM/KG (ref 300–800)
OXYCODONE UR QL SCN: NEGATIVE
PCP UR QL SCN: NEGATIVE
PHOSPHATE SERPL-MCNC: 1.9 MG/DL (ref 2.5–4.5)
PHOSPHATE SERPL-MCNC: 2.8 MG/DL (ref 2.5–4.5)
PLATELET # BLD AUTO: 216 10*3/MM3 (ref 140–450)
PLATELET # BLD AUTO: 254 10*3/MM3 (ref 140–450)
PMV BLD AUTO: 12 FL (ref 6–12)
PMV BLD AUTO: 12.3 FL (ref 6–12)
POTASSIUM SERPL-SCNC: 1.7 MMOL/L (ref 3.5–5.2)
POTASSIUM SERPL-SCNC: 1.7 MMOL/L (ref 3.5–5.2)
POTASSIUM UR-SCNC: 20.3 MMOL/L
PROT SERPL-MCNC: 6.5 G/DL (ref 6–8.5)
PROT SERPL-MCNC: 7.3 G/DL (ref 6–8.5)
RBC # BLD AUTO: 4.61 10*6/MM3 (ref 3.77–5.28)
RBC # BLD AUTO: 4.78 10*6/MM3 (ref 3.77–5.28)
RHINOVIRUS RNA SPEC NAA+PROBE: NOT DETECTED
RSV RNA NPH QL NAA+NON-PROBE: NOT DETECTED
S PNEUM AG SPEC QL LA: NEGATIVE
SARS-COV-2 RNA RESP QL NAA+PROBE: NOT DETECTED
SODIUM SERPL-SCNC: 141 MMOL/L (ref 136–145)
SODIUM SERPL-SCNC: 145 MMOL/L (ref 136–145)
SODIUM UR-SCNC: 20 MMOL/L
T3FREE SERPL-MCNC: 4.32 PG/ML (ref 2–4.4)
T4 FREE SERPL-MCNC: 1.23 NG/DL (ref 0.93–1.7)
TRICYCLICS UR QL SCN: NEGATIVE
TSH SERPL DL<=0.05 MIU/L-ACNC: 0.02 UIU/ML (ref 0.27–4.2)
WBC NRBC COR # BLD AUTO: 13.9 10*3/MM3 (ref 3.4–10.8)
WBC NRBC COR # BLD AUTO: 7.35 10*3/MM3 (ref 3.4–10.8)
WHOLE BLOOD HOLD COAG: NORMAL

## 2025-04-05 PROCEDURE — 63710000001 PREDNISONE PER 1 MG: Performed by: NURSE PRACTITIONER

## 2025-04-05 PROCEDURE — 25010000002 POTASSIUM CHLORIDE 10 MEQ/100ML SOLUTION: Performed by: INTERNAL MEDICINE

## 2025-04-05 PROCEDURE — 25010000002 METOCLOPRAMIDE PER 10 MG: Performed by: NURSE PRACTITIONER

## 2025-04-05 PROCEDURE — 72110 X-RAY EXAM L-2 SPINE 4/>VWS: CPT

## 2025-04-05 PROCEDURE — 84439 ASSAY OF FREE THYROXINE: CPT | Performed by: NURSE PRACTITIONER

## 2025-04-05 PROCEDURE — 84481 FREE ASSAY (FT-3): CPT | Performed by: INTERNAL MEDICINE

## 2025-04-05 PROCEDURE — 99222 1ST HOSP IP/OBS MODERATE 55: CPT | Performed by: INTERNAL MEDICINE

## 2025-04-05 PROCEDURE — 80050 GENERAL HEALTH PANEL: CPT | Performed by: NURSE PRACTITIONER

## 2025-04-05 PROCEDURE — 25010000002 ENOXAPARIN PER 10 MG: Performed by: NURSE PRACTITIONER

## 2025-04-05 PROCEDURE — 96375 TX/PRO/DX INJ NEW DRUG ADDON: CPT

## 2025-04-05 PROCEDURE — 82533 TOTAL CORTISOL: CPT | Performed by: NURSE PRACTITIONER

## 2025-04-05 PROCEDURE — 63710000001 PREDNISONE PER 5 MG: Performed by: NURSE PRACTITIONER

## 2025-04-05 PROCEDURE — 83735 ASSAY OF MAGNESIUM: CPT | Performed by: NURSE PRACTITIONER

## 2025-04-05 PROCEDURE — 80053 COMPREHEN METABOLIC PANEL: CPT | Performed by: NURSE PRACTITIONER

## 2025-04-05 PROCEDURE — 99285 EMERGENCY DEPT VISIT HI MDM: CPT

## 2025-04-05 PROCEDURE — 81003 URINALYSIS AUTO W/O SCOPE: CPT | Performed by: NURSE PRACTITIONER

## 2025-04-05 PROCEDURE — 96366 THER/PROPH/DIAG IV INF ADDON: CPT

## 2025-04-05 PROCEDURE — 85025 COMPLETE CBC W/AUTO DIFF WBC: CPT | Performed by: NURSE PRACTITIONER

## 2025-04-05 PROCEDURE — 83036 HEMOGLOBIN GLYCOSYLATED A1C: CPT | Performed by: NURSE PRACTITIONER

## 2025-04-05 PROCEDURE — 25010000002 POTASSIUM CHLORIDE 10 MEQ/100ML SOLUTION: Performed by: EMERGENCY MEDICINE

## 2025-04-05 PROCEDURE — 83935 ASSAY OF URINE OSMOLALITY: CPT | Performed by: NURSE PRACTITIONER

## 2025-04-05 PROCEDURE — 82746 ASSAY OF FOLIC ACID SERUM: CPT | Performed by: NURSE PRACTITIONER

## 2025-04-05 PROCEDURE — 96372 THER/PROPH/DIAG INJ SC/IM: CPT

## 2025-04-05 PROCEDURE — 87449 NOS EACH ORGANISM AG IA: CPT | Performed by: NURSE PRACTITIONER

## 2025-04-05 PROCEDURE — 84100 ASSAY OF PHOSPHORUS: CPT | Performed by: NURSE PRACTITIONER

## 2025-04-05 PROCEDURE — 93005 ELECTROCARDIOGRAM TRACING: CPT | Performed by: NURSE PRACTITIONER

## 2025-04-05 PROCEDURE — 82088 ASSAY OF ALDOSTERONE: CPT | Performed by: NURSE PRACTITIONER

## 2025-04-05 PROCEDURE — 84133 ASSAY OF URINE POTASSIUM: CPT | Performed by: NURSE PRACTITIONER

## 2025-04-05 PROCEDURE — 87641 MR-STAPH DNA AMP PROBE: CPT | Performed by: NURSE PRACTITIONER

## 2025-04-05 PROCEDURE — 96365 THER/PROPH/DIAG IV INF INIT: CPT

## 2025-04-05 PROCEDURE — 84300 ASSAY OF URINE SODIUM: CPT | Performed by: NURSE PRACTITIONER

## 2025-04-05 PROCEDURE — 0202U NFCT DS 22 TRGT SARS-COV-2: CPT | Performed by: NURSE PRACTITIONER

## 2025-04-05 PROCEDURE — 82330 ASSAY OF CALCIUM: CPT | Performed by: NURSE PRACTITIONER

## 2025-04-05 PROCEDURE — 80074 ACUTE HEPATITIS PANEL: CPT | Performed by: NURSE PRACTITIONER

## 2025-04-05 PROCEDURE — 80306 DRUG TEST PRSMV INSTRMNT: CPT | Performed by: NURSE PRACTITIONER

## 2025-04-05 RX ORDER — ENOXAPARIN SODIUM 100 MG/ML
40 INJECTION SUBCUTANEOUS DAILY
Status: DISCONTINUED | OUTPATIENT
Start: 2025-04-05 | End: 2025-04-07 | Stop reason: HOSPADM

## 2025-04-05 RX ORDER — BISACODYL 5 MG/1
5 TABLET, DELAYED RELEASE ORAL DAILY PRN
Status: DISCONTINUED | OUTPATIENT
Start: 2025-04-05 | End: 2025-04-07 | Stop reason: HOSPADM

## 2025-04-05 RX ORDER — SODIUM CHLORIDE 0.9 % (FLUSH) 0.9 %
10 SYRINGE (ML) INJECTION AS NEEDED
Status: DISCONTINUED | OUTPATIENT
Start: 2025-04-05 | End: 2025-04-07 | Stop reason: HOSPADM

## 2025-04-05 RX ORDER — POLYETHYLENE GLYCOL 3350 17 G/17G
17 POWDER, FOR SOLUTION ORAL DAILY PRN
Status: DISCONTINUED | OUTPATIENT
Start: 2025-04-05 | End: 2025-04-07 | Stop reason: HOSPADM

## 2025-04-05 RX ORDER — POTASSIUM CHLORIDE 7.45 MG/ML
10 INJECTION INTRAVENOUS
Status: COMPLETED | OUTPATIENT
Start: 2025-04-05 | End: 2025-04-05

## 2025-04-05 RX ORDER — NICOTINE 21 MG/24HR
1 PATCH, TRANSDERMAL 24 HOURS TRANSDERMAL
Status: DISCONTINUED | OUTPATIENT
Start: 2025-04-05 | End: 2025-04-07 | Stop reason: HOSPADM

## 2025-04-05 RX ORDER — SODIUM CHLORIDE 9 MG/ML
40 INJECTION, SOLUTION INTRAVENOUS AS NEEDED
Status: DISCONTINUED | OUTPATIENT
Start: 2025-04-05 | End: 2025-04-07 | Stop reason: HOSPADM

## 2025-04-05 RX ORDER — PREDNISONE 20 MG/1
20 TABLET ORAL DAILY
Status: DISCONTINUED | OUTPATIENT
Start: 2025-04-07 | End: 2025-04-07 | Stop reason: HOSPADM

## 2025-04-05 RX ORDER — ESTRADIOL 0.03 MG/D
1 PATCH TRANSDERMAL WEEKLY
COMMUNITY

## 2025-04-05 RX ORDER — METHIMAZOLE 5 MG/1
5 TABLET ORAL DAILY
Status: DISCONTINUED | OUTPATIENT
Start: 2025-04-05 | End: 2025-04-07 | Stop reason: HOSPADM

## 2025-04-05 RX ORDER — SODIUM CHLORIDE 0.9 % (FLUSH) 0.9 %
10 SYRINGE (ML) INJECTION EVERY 12 HOURS SCHEDULED
Status: DISCONTINUED | OUTPATIENT
Start: 2025-04-05 | End: 2025-04-07 | Stop reason: HOSPADM

## 2025-04-05 RX ORDER — ONDANSETRON 2 MG/ML
4 INJECTION INTRAMUSCULAR; INTRAVENOUS EVERY 4 HOURS PRN
Status: DISCONTINUED | OUTPATIENT
Start: 2025-04-05 | End: 2025-04-07 | Stop reason: HOSPADM

## 2025-04-05 RX ORDER — POTASSIUM CHLORIDE 1500 MG/1
40 TABLET, EXTENDED RELEASE ORAL
Status: COMPLETED | OUTPATIENT
Start: 2025-04-05 | End: 2025-04-05

## 2025-04-05 RX ORDER — METOCLOPRAMIDE HYDROCHLORIDE 5 MG/ML
10 INJECTION INTRAMUSCULAR; INTRAVENOUS ONCE
Status: COMPLETED | OUTPATIENT
Start: 2025-04-05 | End: 2025-04-05

## 2025-04-05 RX ORDER — POTASSIUM CHLORIDE 7.45 MG/ML
10 INJECTION INTRAVENOUS
Status: COMPLETED | OUTPATIENT
Start: 2025-04-05 | End: 2025-04-06

## 2025-04-05 RX ORDER — BISACODYL 10 MG
10 SUPPOSITORY, RECTAL RECTAL DAILY PRN
Status: DISCONTINUED | OUTPATIENT
Start: 2025-04-05 | End: 2025-04-07 | Stop reason: HOSPADM

## 2025-04-05 RX ORDER — AMOXICILLIN 250 MG
2 CAPSULE ORAL 2 TIMES DAILY PRN
Status: DISCONTINUED | OUTPATIENT
Start: 2025-04-05 | End: 2025-04-07 | Stop reason: HOSPADM

## 2025-04-05 RX ORDER — POTASSIUM CHLORIDE 1500 MG/1
40 TABLET, EXTENDED RELEASE ORAL EVERY 4 HOURS
Status: COMPLETED | OUTPATIENT
Start: 2025-04-05 | End: 2025-04-06

## 2025-04-05 RX ORDER — NITROGLYCERIN 0.4 MG/1
0.4 TABLET SUBLINGUAL
Status: DISCONTINUED | OUTPATIENT
Start: 2025-04-05 | End: 2025-04-07 | Stop reason: HOSPADM

## 2025-04-05 RX ORDER — PREDNISONE 10 MG/1
10 TABLET ORAL DAILY
Status: DISCONTINUED | OUTPATIENT
Start: 2025-04-09 | End: 2025-04-07 | Stop reason: HOSPADM

## 2025-04-05 RX ADMIN — POTASSIUM CHLORIDE 40 MEQ: 1500 TABLET, EXTENDED RELEASE ORAL at 23:25

## 2025-04-05 RX ADMIN — POTASSIUM CHLORIDE 10 MEQ: 7.46 INJECTION, SOLUTION INTRAVENOUS at 21:28

## 2025-04-05 RX ADMIN — Medication 10 ML: at 20:22

## 2025-04-05 RX ADMIN — METHIMAZOLE 5 MG: 5 TABLET ORAL at 16:27

## 2025-04-05 RX ADMIN — POTASSIUM CHLORIDE 40 MEQ: 1500 TABLET, EXTENDED RELEASE ORAL at 11:30

## 2025-04-05 RX ADMIN — POTASSIUM CHLORIDE 10 MEQ: 7.46 INJECTION, SOLUTION INTRAVENOUS at 23:26

## 2025-04-05 RX ADMIN — POTASSIUM CHLORIDE 10 MEQ: 7.46 INJECTION, SOLUTION INTRAVENOUS at 22:25

## 2025-04-05 RX ADMIN — METOCLOPRAMIDE 10 MG: 5 INJECTION, SOLUTION INTRAMUSCULAR; INTRAVENOUS at 08:32

## 2025-04-05 RX ADMIN — NICOTINE 1 PATCH: 14 PATCH TRANSDERMAL at 17:51

## 2025-04-05 RX ADMIN — POTASSIUM CHLORIDE 10 MEQ: 7.46 INJECTION, SOLUTION INTRAVENOUS at 11:01

## 2025-04-05 RX ADMIN — Medication 1000 MG: at 13:13

## 2025-04-05 RX ADMIN — MUPIROCIN 1 APPLICATION: 20 OINTMENT TOPICAL at 20:22

## 2025-04-05 RX ADMIN — POTASSIUM CHLORIDE 10 MEQ: 7.46 INJECTION, SOLUTION INTRAVENOUS at 20:22

## 2025-04-05 RX ADMIN — POTASSIUM CHLORIDE 10 MEQ: 7.46 INJECTION, SOLUTION INTRAVENOUS at 09:39

## 2025-04-05 RX ADMIN — ENOXAPARIN SODIUM 40 MG: 100 INJECTION SUBCUTANEOUS at 16:27

## 2025-04-05 RX ADMIN — PREDNISONE 30 MG: 10 TABLET ORAL at 16:27

## 2025-04-05 RX ADMIN — POTASSIUM CHLORIDE 40 MEQ: 1500 TABLET, EXTENDED RELEASE ORAL at 09:38

## 2025-04-05 NOTE — ED PROVIDER NOTES
Subjective   History of Present Illness  Chief complaint: weakness      Context: Patient is a 50-year-old female past medical history significant for chronic back pain thyroid dysfunction substance abuse currently on Suboxone presents with complaints of weakness.  She states it has been worse over the last week although she has noted increasing weakness over the last 6 months.  She denies any worsening back pain.  She does have a history of neuropathy.  She   Denies any history of chemo or radiation osteoporosis current IV drug use (reportedly sober from IV heroin for the last 12 years) or chronic steroid use.  Weakness is not ascending in nature. Patient noted to have excoriation to her face but no petechial or purpuric rash.  She denies any fever abdominal pain vomiting diarrhea urinary complaints chest pain shortness of breath, has had some nausea.  Recently started suboxone x 6 days ago but has previously been on that without issues; started synthroid 5 days ago. No recent vaccines.         PCP: bowyer- jones Seipel  Pain management  [facility]  [facility]        Review of Systems   Constitutional:  Negative for fever.       Past Medical History:   Diagnosis Date    Peripheral neuropathy     Seizures        No Known Allergies    No past surgical history on file.    Family History   Problem Relation Age of Onset    Migraines Mother     Dementia Mother        Social History     Socioeconomic History    Marital status:    Tobacco Use    Smoking status: Every Day     Current packs/day: 1.50     Average packs/day: 1.5 packs/day for 30.0 years (45.0 ttl pk-yrs)     Types: Cigarettes    Smokeless tobacco: Never   Vaping Use    Vaping status: Never Used   Substance and Sexual Activity    Alcohol use: Not Currently    Drug use: Yes     Types: Marijuana     Comment: 3 times daily    Sexual activity: Defer           Objective   Physical Exam    Vital signs and traige nurse note reviewed.  Constitutional:   Awake, alert;   the patient is examined in hospital gown.  HEENT:  Normocephalic, facial excoriations;   with intact EOM; oropharynx is pink and moist without edema or erythema.  Poor dentition  Neck: Supple, full range of motion   Cardiovascular: Regular rate and rhythm, normal S1-S2. .  No murmurs or rub  Pulmonary: Respiratory effort regular, nonlabored; breath sounds CTA without wheezing or rhonchi.  Abdomen: Soft, nontender, nondistended with normoactive bowel sounds; no rebound or guarding.    Musculoskeletal: Independent range of motion of all extremities, no palpable tenderness or edema.   Back:  Spine is midline and with midline tenderness on palpation of cervical, thoracic, and lumbar spine that she reports is at baseline; paraspinal musculature is nontender and without soft tissue swelling, overlying ecchymosis or erythema. ROM elicits pain,  Distal muscle strength is 3/5 on bilateral lower extremities.  Equal  and equal strong shoulder shrug  Neuro: Alert oriented x3, speech is clear and appropriate. DTRs are symmetrical bilateral lower extremity, negative Babinski BLE, +  straight leg raise BLE,   equal dorsiflexion of bilateral great toes L4, L5, S1 motor sensory intact.        Procedures           ED Course  ED Course as of 04/05/25 1108   Sat Apr 05, 2025   1106 Spoke with oscar for admission [JW]      ED Course User Index  [JW] Caterina Deal, ALEYDA        Labs Reviewed   COMPREHENSIVE METABOLIC PANEL - Abnormal; Notable for the following components:       Result Value    Glucose 104 (*)     Creatinine 1.20 (*)     Potassium 1.7 (*)     AST (SGOT) 75 (*)     Alkaline Phosphatase 169 (*)     eGFR 54.6 (*)     All other components within normal limits    Narrative:     GFR Categories in Chronic Kidney Disease (CKD)      GFR Category          GFR (mL/min/1.73)    Interpretation  G1                     90 or greater         Normal or high (1)  G2                      60-89                Mild  decrease (1)  G3a                   45-59                Mild to moderate decrease  G3b                   30-44                Moderate to severe decrease  G4                    15-29                Severe decrease  G5                    14 or less           Kidney failure          (1)In the absence of evidence of kidney disease, neither GFR category G1 or G2 fulfill the criteria for CKD.    eGFR calculation 2021 CKD-EPI creatinine equation, which does not include race as a factor   URINE DRUG SCREEN - Abnormal; Notable for the following components:    THC, Screen, Urine Positive (*)     Amphetamine Screen, Urine Positive (*)     Buprenorphine, Screen, Urine Positive (*)     All other components within normal limits    Narrative:     Cutoff For Drugs Screened:    Amphetamines               500 ng/ml  Barbiturates               200 ng/ml  Benzodiazepines            150 ng/ml  Cocaine                    150 ng/ml  Methadone                  200 ng/ml  Opiates                    100 ng/ml  Phencyclidine               25 ng/ml  THC                         50 ng/ml  Methamphetamine            500 ng/ml  Tricyclic Antidepressants  300 ng/ml  Oxycodone                  100 ng/ml  Buprenorphine               10 ng/ml    The normal value for all drugs tested is negative. This report includes unconfirmed screening results, with the cutoff values listed, to be used for medical treatment purposes only.  Unconfirmed results must not be used for non-medical purposes such as employment or legal testing.  Clinical consideration should be applied to any drug of abuse test, particularly when unconfirmed results are used.    All urine drugs of abuse requests without chain of custody are for medical screening purposes only.  False positives are possible.     CBC WITH AUTO DIFFERENTIAL - Abnormal; Notable for the following components:    WBC 13.90 (*)     MPV 12.3 (*)     Neutrophils, Absolute 9.33 (*)     Monocytes, Absolute 1.08 (*)      Eosinophils, Absolute 0.54 (*)     Immature Grans, Absolute 0.06 (*)     All other components within normal limits   TSH RFX ON ABNORMAL TO FREE T4 - Abnormal; Notable for the following components:    TSH 0.022 (*)     All other components within normal limits   T4, FREE - Normal   MAGNESIUM - Normal   CBC AND DIFFERENTIAL    Narrative:     The following orders were created for panel order CBC & Differential.  Procedure                               Abnormality         Status                     ---------                               -----------         ------                     CBC Auto Differential[[id number]]        Abnormal            Final result                 Please view results for these tests on the individual orders.   EXTRA TUBES    Narrative:     The following orders were created for panel order Extra Tubes.  Procedure                               Abnormality         Status                     ---------                               -----------         ------                     Gold Top - SST[[id number]]                                   Final result               Light Blue Top[[id number]]                                   Final result                 Please view results for these tests on the individual orders.   GOLD TOP - SST   LIGHT BLUE TOP     Medications   sodium chloride 0.9 % flush 10 mL (has no administration in time range)   Potassium Replacement - Follow Nurse / BPA Driven Protocol (has no administration in time range)   potassium chloride (KLOR-CON M20) CR tablet 40 mEq (40 mEq Oral Given 4/5/25 0938)   potassium chloride 10 mEq in 100 mL IVPB (10 mEq Intravenous New Bag 4/5/25 1101)   metoclopramide (REGLAN) injection 10 mg (10 mg Intravenous Given 4/5/25 1232)     XR Spine Lumbar Complete 4+VW  Result Date: 4/5/2025  Impression: 1.No acute fracture or dislocation. 2.Increased stool burden. Electronically Signed: Agapito Manzanares MD  4/5/2025 10:10 AM EDT  Workstation ID: [id number]    Prior  "to Admission medications    Medication Sig Start Date End Date Taking? Authorizing Provider   albuterol sulfate  (90 Base) MCG/ACT inhaler INHALE 1-2 PUFFS EVERY 4 TO 6 HOURS AS NEEDED    Hao Ortez MD   amLODIPine (NORVASC) 5 MG tablet Take 1 tablet by mouth Daily. 12/7/23   Hao Ortez MD   buprenorphine-naloxone (SUBOXONE) 8-2 MG per SL tablet Place 1 tablet under the tongue Daily.    Hao Ortez MD   DULoxetine (CYMBALTA) 60 MG capsule Take 1 capsule by mouth Daily.    Hao Ortez MD   gabapentin (NEURONTIN) 800 MG tablet TAKE ONE TABLET BY MOUTH FOUR TIMES DAILY 4/2/25   Flor Hardwick APRN   ibuprofen (ADVIL,MOTRIN) 600 MG tablet Take 1 tablet by mouth Every 6 (Six) Hours. 11/27/23   Hao Ortez MD   levETIRAcetam (KEPPRA) 750 MG tablet TAKE TWO TABLETS BY MOUTH EVERY TWELVE HOURS 3/7/25   Seipel, Joseph F, MD   mirtazapine (REMERON) 45 MG tablet Take 1 tablet by mouth every night at bedtime.    Hao Ortez MD   OLANZapine (zyPREXA) 20 MG tablet olanzapine 20 mg tablet   TAKE ONE TABLET BY MOUTH AT BEDTIME    Hao Ortez MD   omeprazole (priLOSEC) 40 MG capsule Take 1 capsule by mouth Daily. 11/22/23   Hao Ortez MD   promethazine (PHENERGAN) 12.5 MG tablet TAKE ONE TABLET BY MOUTH THREE TIMES DAILY ALTERNATING WITH ZOFRAN AS NEEDED 12/11/23   Hao Ortez MD   rosuvastatin (CRESTOR) 40 MG tablet Take 1 tablet by mouth every night at bedtime. 12/12/23   Hao Ortez MD   SUMAtriptan (IMITREX) 50 MG tablet TAKE ONE TABLET BY MOUTH AT ONSET of migraine. MAY REPEAT one time TWO hours LATER 11/25/24   Flor Hardwick APRN   Vyvanse 60 MG capsule Take 1 capsule by mouth Daily 11/17/23   Hao Ortez MD                                                    Medical Decision Making      /80   Pulse 91   Temp 98.3 °F (36.8 °C) (Oral)   Resp 18   Ht 170.2 cm (67\")   Wt 79.4 kg (175 lb) " "  LMP  (LMP Unknown)   SpO2 97%   BMI 27.41 kg/m²      Chart review:  3/18/25: flood \"weak for 6 months\"  Abnormal thyroid: Her vitals are stable. I have reviewed her chart. She has had two low tsh with normal FTI and FT4 and FT3. DDX includes hyperthyroidism vs euthyroid sick syndrome vs central hypothyroidism. She has lost weight, based on her h/o vitals.She is not on any thyroid-related meds. I recommended her to repeat tsh, free t4 and tt3. Add tpo ab and tsi. If they are not informative and tsh continues to stay low then she will need a thyroid uptake and scan.  thyroid stim ummunoglobulin 0.61  tsh 0.01  free t4 1.09    3/20/25: pain management  The pain is located in low back pain. 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 Patient was on Suboxone. Patient is looking for narcotics for pain control. We discussed she is high risk for opioid misuse, but reports she has been clean from recreational drugs for the last 12 years and her son would support her. Given rx for norco and instructed to f/u with addiction center for suboxone        Radiology interpretation:  X-rays reviewed and interpreted by Yakov: Constipation  Further interpretation by radiologist as above  Lab interpretation:  Labs all viewed by me and significant for, drug screen positive for THC amphetamines and Suboxone, creatinine 1.2 mildly elevated LFTs potassium 1.7 white count 13.9 although chart review shows she is chronically elevated for at least 2 years, TSH 0.022     EKG viewed by me and interpreted by Dr. mack  , qtc 483 SR 94   comparison: 2/13/2017 sinus rhythm rate of 74 QTc 426        Appropriate PPE worn during exam.  Discussed care with: oscar icu np     Patient had an IV established labs x-ray obtained to evaluate for loss of disc space height electrolyte abnormalities.  She has no physical findings consistent with cauda equina or epidural abscess although these were considered.  She was noted " to be hypokalemic and had potassium replacement ordered.  White blood cell count was mildly elevated although it appears to be chronically elevated over the last several years.  She has no fever or acute complaints of infectious etiology.  Drug screen was positive for amphetamines Suboxone and THC, she did initially report THC use when asked about recreational drug use.  She was given Reglan for her nausea.  TSH is still low but improving since starting Synthroid less than a week ago.  Based on the clinical findings at this time I anticipate the patient will require a 2 midnight stay  i discussed findings with patient who voices understanding of admission.    Discussed with Dr. Nagy    Problems Addressed:  Hypokalemia: complicated acute illness or injury  Positive urine drug screen: complicated acute illness or injury  Weakness: complicated acute illness or injury    Amount and/or Complexity of Data Reviewed  Labs: ordered.  Radiology: ordered.  ECG/medicine tests: ordered.    Risk  OTC drugs.  Prescription drug management.  Decision regarding hospitalization.        Final diagnoses:   Weakness   Hypokalemia   Positive urine drug screen       ED Disposition  ED Disposition       ED Disposition   Decision to Admit    Condition   --    Comment   Level of Care: Critical Care [6]   Admitting Physician: YSABEL VILLA [[id number]]   Attending Physician: YSABEL VILLA [[id number]]                 No follow-up provider specified.       Medication List      No changes were made to your prescriptions during this visit.            Caterina Deal, ALEYDA  04/05/25 1015       Caterina Deal, ALEYDA  04/05/25 1108       Caterina Deal, APRN  04/05/25 1127

## 2025-04-05 NOTE — H&P
Critical Care History and Physical     Xochitl Yao : 1975 MRN:[id number] LOS:0 ROOM: 2309/1     Reason for admission: Hypokalemia     Assessment / Plan     Severe hypokalemia, etiology unclear  Hypophosphatemia  No recent GI losses  Patient denies routine alcohol ingestion  Aggressive electrolyte replacement  Cortisol level 28.20  Hemoglobin A1c 5.20  Urine Osmo 239  Urine potassium 20.3, urine sodium 20  Urine negative for glucose or ketones  Aldosterone level in the morning  Outpatient medications reviewed    Hypothyroidism  Continue Synthroid  Consult endocrinology    AST and alk phos elevated  Etiology unclear  Patient denies routine alcohol consumption  Monitor and trend    History of substance abuse  Patient denies IV drug use  Currently being treated with Suboxone    Tobacco abuse disorder, current  Nicotine patch ordered      Code Status (Patient has no pulse and is not breathing): CPR (Attempt to Resuscitate)  Medical Interventions (Patient has pulse or is breathing): Full Support       Nutrition:   Diet: Regular/House; Fluid Consistency: Thin (IDDSI 0)     VTE Prophylaxis:  Pharmacologic VTE prophylaxis orders are present.         History of Present illness     Xochitl Yao is a 50 y.o. female with PMH of fibromyalgia, peripheral neuropathy, seizure disorder, substance abuse disorder, tobacco abuse disorder-current, hyperlipidemia, chronic low back pain and a fairly new diagnosis of hypothyroidism who presented to the ED today for evaluation of weakness and fatigue.  She reports that she has had progressive worsening of her symptoms for a week and came to the ED because she could no longer stand or walk due to the weakness.  She states that she also has severe bilateral lower extremity cramping which has been present at least since yesterday.  She reports she had an episode of nausea and emesis x 1 yesterday however she states that it occurred due to extreme pain and not due to GI issues.  She  "denies that she has had any diarrhea, melena, or hematochezia.  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  She has areas of excoriation on her face, back, and abdomen which she reports is due to a \"skin condition\" which is exacerbated by stress and has increased over the past week.  She states that she has been thirsty for the last few days and has been drinking at least 5 bottles of H2O daily.  She denies any routine use of laxatives or weight loss medications.  She was recently diagnosed with hypothyroidism and has been followed by endocrinology and started on Synthroid 5 days ago.    Evaluation in the emergency department most significantly revealed a potassium of 1.7.  The patient has no arrhythmias or chest pain.  She was admitted to the ICU due to severe hypokalemia    ACP: No ACP documentations on file.  The patient's  is her mother Majo Carney    Patient was seen and examined on 04/05/25 at 16:07 EDT .      Past Medical/Surgical/Social/Family History & Allergies     Past Medical History:   Diagnosis Date    Fibromyalgia     Peripheral neuropathy     Seizure     Seizures       Past Surgical History:   Procedure Laterality Date    HYSTERECTOMY        Social History     Socioeconomic History    Marital status:    Tobacco Use    Smoking status: Every Day     Current packs/day: 1.50     Average packs/day: 1.5 packs/day for 30.0 years (45.0 ttl pk-yrs)     Types: Cigarettes    Smokeless tobacco: Never   Vaping Use    Vaping status: Never Used   Substance and Sexual Activity    Alcohol use: Not Currently    Drug use: Yes     Types: Marijuana     Comment: once a day    Sexual activity: Defer      Family History   Problem Relation Age of Onset    Migraines Mother     Dementia Mother       No Known Allergies   Social Drivers of Health     Tobacco Use: High Risk (4/5/2025)    Patient History     Smoking Tobacco Use: Every Day "     Smokeless Tobacco Use: Never     Passive Exposure: Not on file   Alcohol Use: Not At Risk (4/5/2025)    AUDIT-C     Frequency of Alcohol Consumption: Never     Average Number of Drinks: Patient does not drink     Frequency of Binge Drinking: Never   Financial Resource Strain: Not on file   Food Insecurity: Not on file   Transportation Needs: Not on file   Physical Activity: Not on file   Stress: Not on file   Social Connections: Not on file   Interpersonal Safety: Not At Risk (4/5/2025)    Abuse Screen     Unsafe at Home or Work/School: no     Feels Threatened by Someone?: no     Does Anyone Keep You from Contacting Others or Doint Things Outside the Home?: no     Physical Sign of Abuse Present: no   Depression: Not on file   Housing Stability: Unknown (4/5/2025)    Housing Stability     Current Living Arrangements: apartment     Potentially Unsafe Housing Conditions: Not on file   Utilities: Not on file   Health Literacy: Not on file   Employment: Not on file   Disabilities: Not At Risk (4/5/2025)    Disabilities     Concentrating, Remembering, or Making Decisions Difficulty: no     Doing Errands Independently Difficulty: no        Home Medications     Prior to Admission medications    Medication Sig Start Date End Date Taking? Authorizing Provider   albuterol sulfate  (90 Base) MCG/ACT inhaler INHALE 1-2 PUFFS EVERY 4 TO 6 HOURS AS NEEDED    Hao Ortez MD   amLODIPine (NORVASC) 5 MG tablet Take 1 tablet by mouth Daily. 12/7/23   Hao Ortez MD   buprenorphine-naloxone (SUBOXONE) 8-2 MG per SL tablet Place 1 tablet under the tongue Daily.    Hao Ortez MD   DULoxetine (CYMBALTA) 60 MG capsule Take 1 capsule by mouth Daily.    Hao Ortez MD   gabapentin (NEURONTIN) 800 MG tablet TAKE ONE TABLET BY MOUTH FOUR TIMES DAILY 4/2/25   Flor Hardwick APRN   ibuprofen (ADVIL,MOTRIN) 600 MG tablet Take 1 tablet by mouth Every 6 (Six) Hours. 11/27/23   Pérez  MD Hao   levETIRAcetam (KEPPRA) 750 MG tablet TAKE TWO TABLETS BY MOUTH EVERY TWELVE HOURS 3/7/25   Seipel, Joseph F, MD   mirtazapine (REMERON) 45 MG tablet Take 1 tablet by mouth every night at bedtime.    Hao Ortez MD   OLANZapine (zyPREXA) 20 MG tablet olanzapine 20 mg tablet   TAKE ONE TABLET BY MOUTH AT BEDTIME    Hao Ortez MD   omeprazole (priLOSEC) 40 MG capsule Take 1 capsule by mouth Daily. 11/22/23   Hao Ortez MD   promethazine (PHENERGAN) 12.5 MG tablet TAKE ONE TABLET BY MOUTH THREE TIMES DAILY ALTERNATING WITH ZOFRAN AS NEEDED 12/11/23   Hao Ortez MD   rosuvastatin (CRESTOR) 40 MG tablet Take 1 tablet by mouth every night at bedtime. 12/12/23   Hao Ortez MD   SUMAtriptan (IMITREX) 50 MG tablet TAKE ONE TABLET BY MOUTH AT ONSET of migraine. MAY REPEAT one time TWO hours LATER 11/25/24   Flor Hardwick APRN   Vyvanse 60 MG capsule Take 1 capsule by mouth Daily 11/17/23   Hao Ortez MD        Objective / Physical Exam     Vital signs:  Temp: 98.1 °F (36.7 °C)  BP: 127/88  Heart Rate: 90  Resp: 10  SpO2: 99 %  Weight: 79.2 kg (174 lb 9.7 oz)    Admission Weight: Weight: 79.4 kg (175 lb)    Physical Exam  Vitals and nursing note reviewed.   Constitutional:       General: She is not in acute distress.     Appearance: She is ill-appearing.      Comments: Chronically ill appearing   HENT:      Head: Normocephalic and atraumatic.      Right Ear: External ear normal.      Left Ear: External ear normal.      Nose: Nose normal.      Mouth/Throat:      Mouth: Mucous membranes are moist.   Eyes:      General: No scleral icterus.     Conjunctiva/sclera: Conjunctivae normal.      Pupils: Pupils are equal, round, and reactive to light.   Cardiovascular:      Rate and Rhythm: Regular rhythm.      Heart sounds: Normal heart sounds, S1 normal and S2 normal. No murmur heard.     Comments: SR  Pulmonary:      Effort: Pulmonary effort is  normal. No respiratory distress.      Breath sounds: Wheezing present. No rhonchi.   Abdominal:      General: There is no distension.      Palpations: Abdomen is soft.      Tenderness: There is no abdominal tenderness. There is no guarding.   Musculoskeletal:      Cervical back: Neck supple. No rigidity.      Right lower leg: No edema.      Left lower leg: No edema.   Skin:     General: Skin is warm and dry.      Comments: Profuse dried red lesions on the face  Scattered lesions on the upper back and on the abdomen   Neurological:      General: No focal deficit present.      Mental Status: She is alert and oriented to person, place, and time.   Psychiatric:      Comments: Flat affect          Labs     Results from last 7 days   Lab Units 04/05/25  0826   WBC 10*3/mm3 13.90*   HEMOGLOBIN g/dL 14.5   HEMATOCRIT % 42.7   PLATELETS 10*3/mm3 254      Results from last 7 days   Lab Units 04/05/25  0826   SODIUM mmol/L 141   POTASSIUM mmol/L 1.7*   CHLORIDE mmol/L 99   CO2 mmol/L 27.1   ANION GAP mmol/L 14.9   BUN mg/dL 15   CREATININE mg/dL 1.20*   GLUCOSE mg/dL 104*   PHOSPHORUS mg/dL 1.9*   MAGNESIUM mg/dL 2.3   ALT (SGPT) U/L 29   AST (SGOT) U/L 75*   ALK PHOS U/L 169*            Imaging     Chest X ray: CXR not indicated so far this admission    EKG: My independent evaluation showed sinus rhythm, no ST -T changes    Current Medications     Scheduled Meds:  enoxaparin sodium, 40 mg, Subcutaneous, Daily  methIMAzole, 5 mg, Oral, Daily  mupirocin, 1 Application, Each Nare, BID  predniSONE, 30 mg, Oral, Daily   Followed by  [START ON 4/7/2025] predniSONE, 20 mg, Oral, Daily   Followed by  [START ON 4/9/2025] predniSONE, 10 mg, Oral, Daily  sodium chloride, 10 mL, Intravenous, Q12H         Continuous Infusions:          ALEYDA Cedeño   Critical Care  04/05/25   16:07 EDT

## 2025-04-05 NOTE — PLAN OF CARE
Goal Outcome Evaluation:      Pt in ICU for correction of severe hypokalemia.     20meq IV K, 80 meq PO K, and Kphos administered. Labs redrawn for evans on levels. Awaiting results.     Alert and oriented. With complaints of muscle pain to lower limbs. Ambulates with SBA x1.

## 2025-04-05 NOTE — CONSULTS
ENDOCRINE INITIAL CONSULT NOTE  DATE OF SERVICE: 25  Patient Care Team:  Arabella Hernandez APRN as PCP - General (Nurse Practitioner)        PATIENT NAME: Xochitl Yao  PATIENT : 1975 AGE: 50 y.o.  MRN NUMBER: [id number]  PRIMARY CARE: Arabella Hernandez APRN    ==========================================================================    REASON FOR CONSULT: Hypothyroidism but patient have subclinical hyperthyroidism    HPI / SUBJECTIVE  50 y.o. female with multiple medical problems including recently diagnosed hyperthyroidism started on MMI therapy following dr. Bush in the clinic admitted for generalized weakness.  Patient seen and examined.  Patient was recently diagnosed with subclinical hyperthyroidism by Dr. Bush and started on methimazole 5 mg 1 pill by mouth daily which she is using for last 5 to 7 days.  Patient on evaluation in the ER had blood work done which showed evidence of low TSH is improved as compared to minimally as compared to 3/19/2025.  Patient had free T4 within normal limits.    ==========================================================================                                                PAST MEDICAL HISTORY    Past Medical History:   Diagnosis Date    Fibromyalgia     Peripheral neuropathy     Seizure     Seizures        ==========================================================================    PAST SURGICAL HISTORY    Past Surgical History:   Procedure Laterality Date    HYSTERECTOMY         ==========================================================================    FAMILY HISTORY    Family History   Problem Relation Age of Onset    Migraines Mother     Dementia Mother        ==========================================================================    SOCIAL HISTORY    Social History     Socioeconomic History    Marital status:    Tobacco Use    Smoking status: Every Day     Current packs/day: 1.50     Average packs/day: 1.5 packs/day  for 30.0 years (45.0 ttl pk-yrs)     Types: Cigarettes    Smokeless tobacco: Never   Vaping Use    Vaping status: Never Used   Substance and Sexual Activity    Alcohol use: Not Currently    Drug use: Yes     Types: Marijuana     Comment: once a day    Sexual activity: Defer       ==========================================================================    HOME MEDICATIONS REPORTED ON ADMISSION      Current Facility-Administered Medications:     sennosides-docusate (PERICOLACE) 8.6-50 MG per tablet 2 tablet, 2 tablet, Oral, BID PRN **AND** polyethylene glycol (MIRALAX) packet 17 g, 17 g, Oral, Daily PRN **AND** bisacodyl (DULCOLAX) EC tablet 5 mg, 5 mg, Oral, Daily PRN **AND** bisacodyl (DULCOLAX) suppository 10 mg, 10 mg, Rectal, Daily PRN, Day, Elizabeth G, APRN    enoxaparin sodium (LOVENOX) syringe 40 mg, 40 mg, Subcutaneous, Daily, Day, Elizabeth G, APRN    mupirocin (BACTROBAN) 2 % nasal ointment 1 Application, 1 Application, Each Nare, BID, Day, Elizabeth G, APRN    nitroglycerin (NITROSTAT) SL tablet 0.4 mg, 0.4 mg, Sublingual, Q5 Min PRN, Day, Elizabeth G, APRN    Potassium Replacement - Follow Nurse / BPA Driven Protocol, , Not Applicable, PRN, Caterina Deal APRN    [COMPLETED] Insert Peripheral IV, , , Once **AND** sodium chloride 0.9 % flush 10 mL, 10 mL, Intravenous, PRN, Caterina Deal APRN    sodium chloride 0.9 % flush 10 mL, 10 mL, Intravenous, Q12H, Day, Elizabeth G, APRN    sodium chloride 0.9 % flush 10 mL, 10 mL, Intravenous, PRN, Day, Elizabeth G, APRN    sodium chloride 0.9 % infusion 40 mL, 40 mL, Intravenous, PRN, Day, Elizabeth G, APRN    ==========================================================================    ALLERGIES    No Known Allergies    ==========================================================================    ACTIVE HOSPITAL MEDICATIONS    Scheduled Medications:  enoxaparin sodium, 40 mg, Subcutaneous, Daily  mupirocin, 1 Application, Each Nare, BID  sodium chloride, 10 mL, Intravenous, Q12H          PRN Medications:    senna-docusate sodium **AND** polyethylene glycol **AND** bisacodyl **AND** bisacodyl    nitroglycerin    Potassium Replacement - Follow Nurse / BPA Driven Protocol    [COMPLETED] Insert Peripheral IV **AND** sodium chloride    sodium chloride    sodium chloride     ==========================================================================    OBJECTIVE    Vitals:    04/05/25 1200   BP: 127/88   Pulse: 90   Resp: 10   Temp: 98.1 °F (36.7 °C)   SpO2: 99%      Body mass index is 27.35 kg/m².     General: Alert, cooperative, no acute distress    ==========================================================================    LABORATORY DATA    Lab Results   Component Value Date    GLUCOSE 104 (H) 04/05/2025    BUN 15 04/05/2025    CREATININE 1.20 (H) 04/05/2025    BCR 12.5 04/05/2025    K 1.7 (C) 04/05/2025    CO2 27.1 04/05/2025    CALCIUM 9.5 04/05/2025    ALBUMIN 4.1 04/05/2025    AST 75 (H) 04/05/2025    ALT 29 04/05/2025       Lab Results   Component Value Date    HGBA1C 5.20 04/05/2025     Lab Results   Component Value Date    CREATININE 1.20 (H) 04/05/2025         Component      Latest Ref Rng 4/5/2025   TSH Baseline      0.270 - 4.200 uIU/mL 0.022 (L)    Free T4      0.93 - 1.70 ng/dL 1.23       Legend:  (L) Low    Copied blood work from [facility] but summary is as follows:    3/19/2025  TSH - 0.01  Free T4 - 1.09  Total T3 - 1.31  TPO -ve  TSI +ve    ==========================================================================    TSH  Order: [id number]   suggestion  Result Information displayed in this report will not trend and may not trigger automated decision support.     Component  Ref Range & Units 2 wk ago   Thyroid Stimulating Hormone  0.35 - 4.94 m[U]/L 0.01 Low    Narrative    Biotin does not interfere with this testing methodology.    Specimen Collected: 03/19/25 12:59    Performed by: [facility] Last Resulted: 03/19/25 23:34   Received From: [facility]  Result Received: 04/05/25  08:03    Received Information  THYROID PEROXIDASE ANTIBODY  Order: [id number]   suggestion  Result Information displayed in this report will not trend and may not trigger automated decision support.     Component  Ref Range & Units 2 wk ago   Thyroid Peroxidase (TPO) Antibody  <5.6 [IU]/mL <3.0     Specimen Collected: 03/19/25 12:59    Performed by: [facility] Last Resulted: 03/19/25 23:41   Received From: [facility]  Result Received: 04/05/25 08:03    Received Information   Contains abnormal data THYROID STIMULATING IMMUNOGLOBULIN  Order: [id number]   suggestion  Result Information displayed in this report will not trend and may not trigger automated decision support.     Component  Ref Range & Units 2 wk ago   Thyroid Stim Immunoglobulin  <=0.54 IU/L 0.61 High    Comment: INTERPRETIVE INFORMATION: Thyroid Stimulating Immunoglobulin                            (TSI)  0.54 IU/L or less.........Consistent with healthy thyroid function  or non-Graves thyroid or autoimmune disease. Those with healthy  thyroid function typically have results less than 0.1 IU/L.    0.55 IU/L or greater......Consistent with Graves disease  (autoimmune hyperthyroidism)    This assay specifically detects thyroid stimulating  autoantibodies. For diagnostic purposes, the results obtained from  this assay should be used in combination with clinical  examination, patient medical history, and other findings.  Performed By: [facility]  [address] [place] [address]  : Mayur Eisenberg MD, PhD  CLIA Number: [id number]     Specimen Collected: 03/19/25 12:59    Performed by: [facility] Last Resulted: 03/21/25 11:05   Received From: [facility]  Result Received: 04/05/25 08:03    Received Information  T4, FREE  Order: [id number]   suggestion  Result Information displayed in this report will not trend and may not trigger automated decision support.     Component  Ref Range & Units 2 wk ago   Free T4  0.70 -  1.48 ng/dL 1.09     Specimen Collected: 03/19/25 12:59    Performed by: [facility] Last Resulted: 03/19/25 23:34   Received From: [facility]  Result Received: 04/05/25 08:03    Received Information  T3  Order: [id number]   suggestion  Result Information displayed in this report will not trend and may not trigger automated decision support.     Component  Ref Range & Units 2 wk ago   T3 Total  0.58 - 1.59 ng/mL 1.31     Specimen Collected: 03/19/25 12:59    Performed by: [facility] Last Resulted: 03/19/25 23:34   Received From: [facility]  Result Received: 04/05/25 08:03     ==========================================================================    ASSESSMENT AND PLAN    # Hyperthyroidism with Low TSH but normal Free T4 - Free T3 levels, elevated TSI levels  - Patient already started on MMI therapy by Dr. Bush  - Agree with continuing MMI 5 mgs PO Daily  - Will need repeat blood work in 4-6 weeks time  - Patient to follow Dr. Bush on d/c  - Free T3 levels ordered    Thank you for courtesy of consultation.  Will follow with you.  Rest as per primary care.    Part of this note may be an electronic transcription/translation of spoken language to printed text using the Dragon Dictation System.     The time of this note does not reflect the time I saw the patient but the time that this note was written.    =======================================  John Rg MD  Department of Endocrine, Diabetes and Metabolism  [facility], [place]  =======================================

## 2025-04-06 LAB
ALBUMIN SERPL-MCNC: 3.7 G/DL (ref 3.5–5.2)
ALBUMIN/GLOB SERPL: 1.3 G/DL
ALP SERPL-CCNC: 159 U/L (ref 39–117)
ALT SERPL W P-5'-P-CCNC: 27 U/L (ref 1–33)
ANION GAP SERPL CALCULATED.3IONS-SCNC: 12.7 MMOL/L (ref 5–15)
ANION GAP SERPL CALCULATED.3IONS-SCNC: 13.7 MMOL/L (ref 5–15)
AST SERPL-CCNC: 63 U/L (ref 1–32)
BILIRUB SERPL-MCNC: 0.3 MG/DL (ref 0–1.2)
BUN SERPL-MCNC: 15 MG/DL (ref 6–20)
BUN SERPL-MCNC: 15 MG/DL (ref 6–20)
BUN/CREAT SERPL: 13.8 (ref 7–25)
BUN/CREAT SERPL: 15.8 (ref 7–25)
CALCIUM SPEC-SCNC: 8.6 MG/DL (ref 8.6–10.5)
CALCIUM SPEC-SCNC: 8.7 MG/DL (ref 8.6–10.5)
CHLORIDE SERPL-SCNC: 102 MMOL/L (ref 98–107)
CHLORIDE SERPL-SCNC: 102 MMOL/L (ref 98–107)
CO2 SERPL-SCNC: 24.3 MMOL/L (ref 22–29)
CO2 SERPL-SCNC: 27.3 MMOL/L (ref 22–29)
CREAT SERPL-MCNC: 0.95 MG/DL (ref 0.57–1)
CREAT SERPL-MCNC: 1.09 MG/DL (ref 0.57–1)
EGFRCR SERPLBLD CKD-EPI 2021: 61.3 ML/MIN/1.73
EGFRCR SERPLBLD CKD-EPI 2021: 72.2 ML/MIN/1.73
FOLATE SERPL-MCNC: 14.8 NG/ML (ref 4.78–24.2)
GLOBULIN UR ELPH-MCNC: 2.8 GM/DL
GLUCOSE SERPL-MCNC: 130 MG/DL (ref 65–99)
GLUCOSE SERPL-MCNC: 149 MG/DL (ref 65–99)
HAV IGM SERPL QL IA: ABNORMAL
HBV CORE IGM SERPL QL IA: ABNORMAL
HBV SURFACE AG SERPL QL IA: ABNORMAL
HCV AB SER QL: REACTIVE
HIV 1+2 AB+HIV1 P24 AG SERPL QL IA: NORMAL
MAGNESIUM SERPL-MCNC: 2.3 MG/DL (ref 1.6–2.6)
MAGNESIUM SERPL-MCNC: 2.4 MG/DL (ref 1.6–2.6)
PHOSPHATE SERPL-MCNC: 3 MG/DL (ref 2.5–4.5)
POTASSIUM SERPL-SCNC: 2.1 MMOL/L (ref 3.5–5.2)
POTASSIUM SERPL-SCNC: 3.1 MMOL/L (ref 3.5–5.2)
PROT SERPL-MCNC: 6.5 G/DL (ref 6–8.5)
QT INTERVAL: 386 MS
QTC INTERVAL: 483 MS
SODIUM SERPL-SCNC: 140 MMOL/L (ref 136–145)
SODIUM SERPL-SCNC: 142 MMOL/L (ref 136–145)

## 2025-04-06 PROCEDURE — 96366 THER/PROPH/DIAG IV INF ADDON: CPT

## 2025-04-06 PROCEDURE — 63710000001 PREDNISONE PER 1 MG: Performed by: NURSE PRACTITIONER

## 2025-04-06 PROCEDURE — 83735 ASSAY OF MAGNESIUM: CPT | Performed by: INTERNAL MEDICINE

## 2025-04-06 PROCEDURE — G0432 EIA HIV-1/HIV-2 SCREEN: HCPCS | Performed by: NURSE PRACTITIONER

## 2025-04-06 PROCEDURE — C1751 CATH, INF, PER/CENT/MIDLINE: HCPCS

## 2025-04-06 PROCEDURE — 02HV33Z INSERTION OF INFUSION DEVICE INTO SUPERIOR VENA CAVA, PERCUTANEOUS APPROACH: ICD-10-PCS | Performed by: INTERNAL MEDICINE

## 2025-04-06 PROCEDURE — 25010000002 POTASSIUM CHLORIDE 10 MEQ/100ML SOLUTION: Performed by: INTERNAL MEDICINE

## 2025-04-06 PROCEDURE — 25010000002 LIDOCAINE 1 % SOLUTION: Performed by: INTERNAL MEDICINE

## 2025-04-06 PROCEDURE — 25010000002 ONDANSETRON PER 1 MG: Performed by: NURSE PRACTITIONER

## 2025-04-06 PROCEDURE — 96375 TX/PRO/DX INJ NEW DRUG ADDON: CPT

## 2025-04-06 PROCEDURE — 99232 SBSQ HOSP IP/OBS MODERATE 35: CPT | Performed by: INTERNAL MEDICINE

## 2025-04-06 PROCEDURE — 63710000001 PREDNISONE PER 5 MG: Performed by: NURSE PRACTITIONER

## 2025-04-06 PROCEDURE — 25010000002 ENOXAPARIN PER 10 MG: Performed by: NURSE PRACTITIONER

## 2025-04-06 PROCEDURE — 80048 BASIC METABOLIC PNL TOTAL CA: CPT | Performed by: INTERNAL MEDICINE

## 2025-04-06 PROCEDURE — 96372 THER/PROPH/DIAG INJ SC/IM: CPT

## 2025-04-06 RX ORDER — GABAPENTIN 400 MG/1
800 CAPSULE ORAL 3 TIMES DAILY PRN
Status: DISCONTINUED | OUTPATIENT
Start: 2025-04-06 | End: 2025-04-07 | Stop reason: HOSPADM

## 2025-04-06 RX ORDER — DULOXETIN HYDROCHLORIDE 30 MG/1
60 CAPSULE, DELAYED RELEASE ORAL DAILY
Status: DISCONTINUED | OUTPATIENT
Start: 2025-04-06 | End: 2025-04-07 | Stop reason: HOSPADM

## 2025-04-06 RX ORDER — LEVETIRACETAM 500 MG/1
1500 TABLET ORAL 2 TIMES DAILY
Status: DISCONTINUED | OUTPATIENT
Start: 2025-04-06 | End: 2025-04-07 | Stop reason: HOSPADM

## 2025-04-06 RX ORDER — POTASSIUM CHLORIDE 1500 MG/1
40 TABLET, EXTENDED RELEASE ORAL EVERY 4 HOURS
Status: COMPLETED | OUTPATIENT
Start: 2025-04-06 | End: 2025-04-07

## 2025-04-06 RX ORDER — BUPRENORPHINE HYDROCHLORIDE AND NALOXONE HYDROCHLORIDE DIHYDRATE 8; 2 MG/1; MG/1
1 TABLET SUBLINGUAL DAILY
Status: DISCONTINUED | OUTPATIENT
Start: 2025-04-06 | End: 2025-04-07 | Stop reason: HOSPADM

## 2025-04-06 RX ORDER — GABAPENTIN 400 MG/1
800 CAPSULE ORAL NIGHTLY
Status: DISCONTINUED | OUTPATIENT
Start: 2025-04-06 | End: 2025-04-07 | Stop reason: HOSPADM

## 2025-04-06 RX ORDER — OLANZAPINE 10 MG/1
20 TABLET, FILM COATED ORAL NIGHTLY
Status: DISCONTINUED | OUTPATIENT
Start: 2025-04-06 | End: 2025-04-07 | Stop reason: HOSPADM

## 2025-04-06 RX ORDER — LIDOCAINE HYDROCHLORIDE 10 MG/ML
10 INJECTION, SOLUTION INFILTRATION; PERINEURAL ONCE
Status: COMPLETED | OUTPATIENT
Start: 2025-04-06 | End: 2025-04-06

## 2025-04-06 RX ORDER — SODIUM CHLORIDE 0.9 % (FLUSH) 0.9 %
20 SYRINGE (ML) INJECTION AS NEEDED
Status: DISCONTINUED | OUTPATIENT
Start: 2025-04-06 | End: 2025-04-07 | Stop reason: HOSPADM

## 2025-04-06 RX ORDER — SODIUM CHLORIDE 9 MG/ML
40 INJECTION, SOLUTION INTRAVENOUS AS NEEDED
Status: DISCONTINUED | OUTPATIENT
Start: 2025-04-06 | End: 2025-04-07 | Stop reason: HOSPADM

## 2025-04-06 RX ORDER — SODIUM CHLORIDE 0.9 % (FLUSH) 0.9 %
10 SYRINGE (ML) INJECTION AS NEEDED
Status: DISCONTINUED | OUTPATIENT
Start: 2025-04-06 | End: 2025-04-07 | Stop reason: HOSPADM

## 2025-04-06 RX ORDER — MIRTAZAPINE 15 MG/1
45 TABLET, FILM COATED ORAL NIGHTLY
Status: DISCONTINUED | OUTPATIENT
Start: 2025-04-06 | End: 2025-04-07 | Stop reason: HOSPADM

## 2025-04-06 RX ORDER — SODIUM CHLORIDE 0.9 % (FLUSH) 0.9 %
10 SYRINGE (ML) INJECTION EVERY 12 HOURS SCHEDULED
Status: DISCONTINUED | OUTPATIENT
Start: 2025-04-06 | End: 2025-04-07 | Stop reason: HOSPADM

## 2025-04-06 RX ORDER — GABAPENTIN 400 MG/1
800 CAPSULE ORAL 4 TIMES DAILY
Status: DISCONTINUED | OUTPATIENT
Start: 2025-04-06 | End: 2025-04-06

## 2025-04-06 RX ORDER — PANTOPRAZOLE SODIUM 40 MG/1
40 TABLET, DELAYED RELEASE ORAL
Status: DISCONTINUED | OUTPATIENT
Start: 2025-04-06 | End: 2025-04-07 | Stop reason: HOSPADM

## 2025-04-06 RX ORDER — POTASSIUM CHLORIDE 1500 MG/1
40 TABLET, EXTENDED RELEASE ORAL EVERY 4 HOURS
Status: COMPLETED | OUTPATIENT
Start: 2025-04-06 | End: 2025-04-06

## 2025-04-06 RX ADMIN — LIDOCAINE HYDROCHLORIDE 10 ML: 10 INJECTION, SOLUTION INFILTRATION; PERINEURAL at 12:32

## 2025-04-06 RX ADMIN — POTASSIUM CHLORIDE 40 MEQ: 1500 TABLET, EXTENDED RELEASE ORAL at 15:25

## 2025-04-06 RX ADMIN — Medication 10 ML: at 08:55

## 2025-04-06 RX ADMIN — PREDNISONE 30 MG: 10 TABLET ORAL at 08:54

## 2025-04-06 RX ADMIN — MUPIROCIN 1 APPLICATION: 20 OINTMENT TOPICAL at 20:04

## 2025-04-06 RX ADMIN — Medication 10 ML: at 20:04

## 2025-04-06 RX ADMIN — POTASSIUM CHLORIDE 10 MEQ: 7.46 INJECTION, SOLUTION INTRAVENOUS at 00:30

## 2025-04-06 RX ADMIN — MIRTAZAPINE 45 MG: 15 TABLET, FILM COATED ORAL at 20:03

## 2025-04-06 RX ADMIN — GABAPENTIN 800 MG: 400 CAPSULE ORAL at 20:02

## 2025-04-06 RX ADMIN — ONDANSETRON 4 MG: 2 INJECTION, SOLUTION INTRAMUSCULAR; INTRAVENOUS at 11:55

## 2025-04-06 RX ADMIN — POTASSIUM CHLORIDE 40 MEQ: 1500 TABLET, EXTENDED RELEASE ORAL at 20:02

## 2025-04-06 RX ADMIN — POTASSIUM CHLORIDE 10 MEQ: 7.46 INJECTION, SOLUTION INTRAVENOUS at 01:30

## 2025-04-06 RX ADMIN — MUPIROCIN 1 APPLICATION: 20 OINTMENT TOPICAL at 08:55

## 2025-04-06 RX ADMIN — LEVETIRACETAM 1500 MG: 500 TABLET, FILM COATED ORAL at 20:02

## 2025-04-06 RX ADMIN — BUPRENORPHINE HYDROCHLORIDE AND NALOXONE HYDROCHLORIDE DIHYDRATE 1 TABLET: 8; 2 TABLET SUBLINGUAL at 10:43

## 2025-04-06 RX ADMIN — OLANZAPINE 20 MG: 5 TABLET, FILM COATED ORAL at 20:03

## 2025-04-06 RX ADMIN — DULOXETINE 60 MG: 30 CAPSULE, DELAYED RELEASE ORAL at 10:43

## 2025-04-06 RX ADMIN — METHIMAZOLE 5 MG: 5 TABLET ORAL at 08:55

## 2025-04-06 RX ADMIN — POTASSIUM CHLORIDE 40 MEQ: 1500 TABLET, EXTENDED RELEASE ORAL at 11:55

## 2025-04-06 RX ADMIN — GABAPENTIN 800 MG: 400 CAPSULE ORAL at 11:55

## 2025-04-06 RX ADMIN — POTASSIUM CHLORIDE 40 MEQ: 1500 TABLET, EXTENDED RELEASE ORAL at 08:54

## 2025-04-06 RX ADMIN — PANTOPRAZOLE SODIUM 40 MG: 40 TABLET, DELAYED RELEASE ORAL at 10:43

## 2025-04-06 RX ADMIN — ENOXAPARIN SODIUM 40 MG: 100 INJECTION SUBCUTANEOUS at 15:25

## 2025-04-06 RX ADMIN — NICOTINE 1 PATCH: 14 PATCH TRANSDERMAL at 17:08

## 2025-04-06 RX ADMIN — LEVETIRACETAM 1500 MG: 500 TABLET, FILM COATED ORAL at 10:43

## 2025-04-06 RX ADMIN — POTASSIUM CHLORIDE 40 MEQ: 1500 TABLET, EXTENDED RELEASE ORAL at 04:15

## 2025-04-06 NOTE — CONSULTS
[facility] MEDICINE SERVICE  TRANSFER OF CARE/ACCEPTANCE NOTE    PATIENT NAME: Xochitl Yoa  : 1975  MRN: [id number]     Active Hospital Problems    Diagnosis  POA    **Hypokalemia [E87.6]  Yes    Acute hypokalemia [E87.6]  Yes      Resolved Hospital Problems   No resolved problems to display.       Interim History:  Patient presented to the emergency department yesterday with weakness and fatigue and was was admitted to the ICU with severe hypokalemia, potassium 1.7 with unclear etiology. Patient also had hypophosphatemia.  Patient underwent aggressive electrolyte replacement.  Patient did not experience any dysrhythmia during electrolyte replacement.  Patient was recently diagnosed with hypothyroidism and started taking Synthroid 5 days prior to admission.    I have noted the following changes since admission: Endocrinology consulted for hyperthyroidism.  Potassium has improved to 3.1 on most recent draw today at 1320.  Patient stable for downgrade from ICU to PCU.    I have reviewed the H&P, diagnostic data and plan of care for Xochitl Yao.  I will be taking over care of this patient during the current hospitalization.        Signature: Electronically signed by ALEYDA Robledo, 25, 16:25 EDT.  [facility] Hospitalist Team    *This is a nonbillable note.*

## 2025-04-06 NOTE — CONSULTS
PICC Line Insertion Procedure Note    Procedure: Insertion of #4 FR/18G PICC    Indications:  Poor Access    Active Time Out:  Correct patient: Yes  Correct procedure: Yes  Correct site: Yes  Verified with: aprn    Procedure Details:  Informed consent was obtained for the procedure.  Risk include, but are not limited to infection, air embolism, catheter tip moving, catheter blockage and phlebitis.     Maximum sterile technique was used including antiseptics, cap, gloves, gown, hand hygiene, mask, and sheet.    Ultrasound Guidance: Yes    #4 FR/18G PICC inserted to the R Basilic vein per hospital protocol by WILLIAM lee.   Non-pulsatile blood return: yes    Lot #: [id number]  Expiration date: 02282026    Complications:  none    Findings:  Catheter inserted to 38 cm, with 1 cm exposed.   Mid upper arm circumference is 28 cm.   Catheter was flushed with 30 cc NS and sterile dressing applied.  Patient tolerated procedure well.  PICC tip verified by:       [x] Sapiens 3cg       [] Chest X-ray    Recommendations:  Verbal and/or written Care/Maintenance instructions provided to patient.   Primary nurse notified.    Zach Lee RN  04/06/25  13:48 EDT

## 2025-04-06 NOTE — NURSING NOTE
This patient had medications within her purse that were previously charted as sent home. Suboxone was still present as well as inhalers. This RN notified the patient that these either needed sent home with family or they could be sent down to our pharmacy until discharge.    Patient called family member to  medications.     This RN went back in the patients room to check and see if the family member took medications home. Patient said yes, but did not allow this RN to check her purse to verify. Charge RN, Isha, notified.

## 2025-04-06 NOTE — PROGRESS NOTES
"Critical Care Progress Note     Xochitl Yao : 1975 MRN:[id number] LOS:1  Rm: 2309/1     Principal Problem: Hypokalemia     Reason for follow up: All the medical problems listed below    Summary     Xochitl Yao is a 50 y.o. female with PMH of fibromyalgia, peripheral neuropathy, seizure disorder, substance abuse disorder, tobacco abuse disorder-current, hyperlipidemia, chronic low back pain and a fairly new diagnosis of hypothyroidism who presented to the ED today for evaluation of weakness and fatigue.  She reports that she has had progressive worsening of her symptoms for a week and came to the ED because she could no longer stand or walk due to the weakness.  She states that she also has severe bilateral lower extremity cramping which has been present at least since yesterday.  She reports she had an episode of nausea and emesis x 1 yesterday however she states that it occurred due to extreme pain and not due to GI issues.  She denies that she has had any diarrhea, melena, or hematochezia.  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  She has areas of excoriation on her face, back, and abdomen which she reports is due to a \"skin condition\" which is exacerbated by stress and has increased over the past week.  She states that she has been thirsty for the last few days and has been drinking at least 5 bottles of H2O daily.  She denies any routine use of laxatives or weight loss medications.  She was recently diagnosed with hypothyroidism and has been followed by endocrinology and started on Synthroid 5 days ago.     Evaluation in the emergency department most significantly revealed a potassium of 1.7.  The patient has no arrhythmias or chest pain.  She was admitted to the ICU due to severe hypokalemia     ACP: No ACP documentations on file.  The patient's  is her mother Majo Carney       Patient is on Hospital Day: " 2.    Significant Events / Subjective     04/06/25 : The patient reports she feels much better today.  She has more energy and is ambulating around her room.  She is tolerating room air with no shortness of air.  She denies nausea or vomiting.  She has not had any chest pain or shortness of air.    Assessment / Plan     Severe hypokalemia, etiology unclear  Hypophosphatemia  No recent GI losses  Patient denies routine alcohol ingestion  Aggressive electrolyte replacement  Cortisol level 28.20  Hemoglobin A1c 5.20  Urine Osmo 239  Urine potassium 20.3, urine sodium 20  Urine negative for glucose or ketones  Aldosterone level pending  Outpatient medications reviewed     Hypothyroidism  Continue Synthroid  Consulted endocrinology     AST and alk phos elevated  Etiology unclear  Patient denies routine alcohol consumption  Monitor and trend     History of substance abuse  Patient denies IV drug use  Currently being treated with Suboxone    Presumed emphysema secondary to significant tobacco abuse history  Wheezing on initial examination  Continue tapered dose prednisone     Tobacco abuse disorder, current  Nicotine patch ordered    Disposition:  PCU    Code status:   Code Status (Patient has no pulse and is not breathing): CPR (Attempt to Resuscitate)  Medical Interventions (Patient has pulse or is breathing): Full Support       Nutrition:   Diet: Regular/House; Fluid Consistency: Thin (IDDSI 0)   Patient isn't on Tube Feeding     VTE Prophylaxis:  Pharmacologic VTE prophylaxis orders are present.           Objective / Physical Exam     Vital signs:  Temp: 98.2 °F (36.8 °C)  BP: 133/96  Heart Rate: 101  Resp: 11  SpO2: 97 %  Weight: 74.3 kg (163 lb 12.8 oz)    Admission Weight: Weight: 79.4 kg (175 lb)  Current Weight: Weight: 74.3 kg (163 lb 12.8 oz)    Input/Output in last 24 hours:    Intake/Output Summary (Last 24 hours) at 4/6/2025 1308  Last data filed at 4/6/2025 0900  Gross per 24 hour   Intake 2170 ml   Output  --   Net 2170 ml      Net IO Since Admission: 2,170 mL [04/06/25 1308]     Physical Exam  Vitals and nursing note reviewed.   Constitutional:       General: She is not in acute distress.     Appearance: Normal appearance.   HENT:      Head: Normocephalic and atraumatic.      Right Ear: External ear normal.      Left Ear: External ear normal.      Mouth/Throat:      Mouth: Mucous membranes are moist.   Eyes:      Conjunctiva/sclera: Conjunctivae normal.      Pupils: Pupils are equal, round, and reactive to light.   Cardiovascular:      Heart sounds: Normal heart sounds, S1 normal and S2 normal. No murmur heard.  Pulmonary:      Breath sounds: Normal breath sounds. No wheezing or rhonchi.   Abdominal:      General: Bowel sounds are normal. There is no distension.      Palpations: Abdomen is soft.      Tenderness: There is no abdominal tenderness.   Musculoskeletal:      Right lower leg: No edema.      Left lower leg: No edema.   Skin:     General: Skin is warm and dry.      Comments: Profuse dried red lesions on the face  Scattered lesions on the upper back and on the abdomen      Neurological:      General: No focal deficit present.      Mental Status: She is alert and oriented to person, place, and time.          Radiology and Labs     Results from last 7 days   Lab Units 04/05/25  2343 04/05/25  0826   WBC 10*3/mm3 7.35 13.90*   HEMOGLOBIN g/dL 13.8 14.5   HEMATOCRIT % 41.7 42.7   PLATELETS 10*3/mm3 216 254           Results from last 7 days   Lab Units 04/05/25  2343 04/05/25  1803 04/05/25  0826   SODIUM mmol/L 142 145 141   POTASSIUM mmol/L 2.1* 1.7* 1.7*   CHLORIDE mmol/L 102 103 99   CO2 mmol/L 27.3 27.9 27.1   ANION GAP mmol/L 12.7 14.1 14.9   BUN mg/dL 15 14 15   CREATININE mg/dL 1.09* 1.12* 1.20*   GLUCOSE mg/dL 130* 103* 104*   PHOSPHORUS mg/dL 3.0 2.8 1.9*   MAGNESIUM mg/dL 2.4 2.3 2.3   ALT (SGPT) U/L 27 26 29   AST (SGOT) U/L 63* 65* 75*   ALK PHOS U/L 159* 154* 169*      Results from last 7 days   Lab  Units 04/05/25  2343 04/05/25  1803 04/05/25  0826   ALT (SGPT) U/L 27 26 29   AST (SGOT) U/L 63* 65* 75*   ALK PHOS U/L 159* 154* 169*           XR Spine Lumbar Complete 4+VW  Result Date: 4/5/2025  Impression: 1.No acute fracture or dislocation. 2.Increased stool burden. Electronically Signed: Agapito Manzanares MD  4/5/2025 10:10 AM EDT  Workstation ID: [id number]      Current medications     Scheduled Meds:   buprenorphine-naloxone, 1 tablet, Sublingual, Daily  DULoxetine, 60 mg, Oral, Daily  enoxaparin sodium, 40 mg, Subcutaneous, Daily  gabapentin, 800 mg, Oral, Nightly  levETIRAcetam, 1,500 mg, Oral, BID  methIMAzole, 5 mg, Oral, Daily  mirtazapine, 45 mg, Oral, Nightly  mupirocin, 1 Application, Each Nare, BID  nicotine, 1 patch, Transdermal, Q24H  OLANZapine, 20 mg, Oral, Nightly  pantoprazole, 40 mg, Oral, Q AM  [START ON 4/7/2025] predniSONE, 20 mg, Oral, Daily   Followed by  [START ON 4/9/2025] predniSONE, 10 mg, Oral, Daily  sodium chloride, 10 mL, Intravenous, Q12H        Continuous Infusions:          Plan discussed with RN. Reviewed all other data in the last 24 hours, including but not limited to vitals, labs, microbiology, imaging and pertinent notes from other providers.        ALEYDA Cedeño   Critical Care  04/06/25   13:08 EDT

## 2025-04-06 NOTE — PROGRESS NOTES
ENDOCRINE CONSULT PROGRESS NOTE  DATE OF SERVICE: 25        PATIENT NAME: Xochitl Yao  PATIENT : 1975 AGE: 50 y.o.  MRN NUMBER: [id number]    ==========================================================================    CHIEF COMPLAINT: Hyperthyroidism    CARE TEAM:   Patient Care Team:  Arabella Hernandez APRN as PCP - General (Nurse Practitioner)    SUBJECTIVE    Pt seen and examined.  Labs reviewed.    ==========================================================================    CURRENT ACTIVE HOSPITAL MEDICATIONS    Scheduled Medications:  buprenorphine-naloxone, 1 tablet, Sublingual, Daily  DULoxetine, 60 mg, Oral, Daily  enoxaparin sodium, 40 mg, Subcutaneous, Daily  gabapentin, 800 mg, Oral, Nightly  levETIRAcetam, 1,500 mg, Oral, BID  methIMAzole, 5 mg, Oral, Daily  mirtazapine, 45 mg, Oral, Nightly  mupirocin, 1 Application, Each Nare, BID  nicotine, 1 patch, Transdermal, Q24H  OLANZapine, 20 mg, Oral, Nightly  pantoprazole, 40 mg, Oral, Q AM  potassium chloride, 40 mEq, Oral, Q4H  [START ON 2025] predniSONE, 20 mg, Oral, Daily   Followed by  [START ON 2025] predniSONE, 10 mg, Oral, Daily  sodium chloride, 10 mL, Intravenous, Q12H  sodium chloride, 10 mL, Intravenous, Q12H  sodium chloride, 10 mL, Intravenous, Q12H         PRN Medications:    senna-docusate sodium **AND** polyethylene glycol **AND** bisacodyl **AND** bisacodyl    gabapentin    nitroglycerin    ondansetron    Potassium Replacement - Follow Nurse / BPA Driven Protocol    [COMPLETED] Insert Peripheral IV **AND** sodium chloride    sodium chloride    sodium chloride    sodium chloride    sodium chloride    sodium chloride     ==========================================================================    OBJECTIVE    Vitals:    25 1500   BP: 126/87   Pulse: 98   Resp:    Temp:    SpO2: 95%      Body mass index is 25.66 kg/m².     General - A&Ox3, NAD,  Calm    ==========================================================================    LAB EVALUATION    Lab Results   Component Value Date    GLUCOSE 149 (H) 04/06/2025    BUN 15 04/06/2025    CREATININE 0.95 04/06/2025    BCR 15.8 04/06/2025    K 3.1 (L) 04/06/2025    CO2 24.3 04/06/2025    CALCIUM 8.6 04/06/2025    ALBUMIN 3.7 04/05/2025    AST 63 (H) 04/05/2025    ALT 27 04/05/2025       Lab Results   Component Value Date    HGBA1C 5.20 04/05/2025     Lab Results   Component Value Date    CREATININE 0.95 04/06/2025         Component      Latest Ref Rng 4/5/2025   TSH Baseline      0.270 - 4.200 uIU/mL 0.022 (L)    Free T4      0.93 - 1.70 ng/dL 1.23    T3, Free      2.00 - 4.40 pg/mL 4.32       Legend:  (L) Low    ==========================================================================    ASSESSMENT AND PLAN    # Hyperthyroidism  - Blood work reviewed for TSH, free T4 and free T3 levels  - Continue methimazole 5 mg 1 pill by mouth daily  - Repeat blood work in about 5 weeks time and patient to follow-up with Dr. Bush as outpatient    Thank you for courtesy of consultation, endocrine team will currently sign off, please feel free to reconsult if needed.  Rest as per primary team.    Part of this note may be an electronic transcription/translation of spoken language to printed text using the Dragon Dictation System.     Note: Portions of this note may have been copied from previous notes but documentation have been reviewed and edited as necessary to support clinical decision making for today's visit.  The time of this note does not reflect the time I saw the patient but the time that this note was written.    ==========================================================================  John Rg MD  Department of Endocrine, Diabetes and Metabolism  [place], [place]  ==========================================================================

## 2025-04-06 NOTE — SIGNIFICANT NOTE
"At 0725, this RN stuck patient x 2 trying to get labs and was unsuccessful. Patient is refusing any other sticks. This RN reached out to ICU MD and a PICC line order was placed for lab draws/poor venous access. This RN went into the patients room to discuss PICC consent and patient is refusing to sign consent. Patient is asking if she can \"just go home instead\". This RN notified ICU MD and plan is to discuss patient during rounds.   "

## 2025-04-07 ENCOUNTER — NURSE TRIAGE (OUTPATIENT)
Dept: CALL CENTER | Facility: HOSPITAL | Age: 50
End: 2025-04-07
Payer: MEDICAID

## 2025-04-07 VITALS
BODY MASS INDEX: 25.85 KG/M2 | WEIGHT: 164.68 LBS | HEART RATE: 79 BPM | DIASTOLIC BLOOD PRESSURE: 83 MMHG | HEIGHT: 67 IN | SYSTOLIC BLOOD PRESSURE: 119 MMHG | RESPIRATION RATE: 15 BRPM | OXYGEN SATURATION: 91 % | TEMPERATURE: 97.6 F

## 2025-04-07 LAB
ALBUMIN SERPL-MCNC: 3.1 G/DL (ref 3.5–5.2)
ALBUMIN/GLOB SERPL: 1.3 G/DL
ALP SERPL-CCNC: 123 U/L (ref 39–117)
ALT SERPL W P-5'-P-CCNC: 28 U/L (ref 1–33)
ANION GAP SERPL CALCULATED.3IONS-SCNC: 10.3 MMOL/L (ref 5–15)
AST SERPL-CCNC: 53 U/L (ref 1–32)
BASOPHILS # BLD AUTO: 0.06 10*3/MM3 (ref 0–0.2)
BASOPHILS NFR BLD AUTO: 0.5 % (ref 0–1.5)
BILIRUB SERPL-MCNC: 0.2 MG/DL (ref 0–1.2)
BUN SERPL-MCNC: 16 MG/DL (ref 6–20)
BUN/CREAT SERPL: 17.6 (ref 7–25)
CALCIUM SPEC-SCNC: 8.3 MG/DL (ref 8.6–10.5)
CHLORIDE SERPL-SCNC: 108 MMOL/L (ref 98–107)
CO2 SERPL-SCNC: 25.7 MMOL/L (ref 22–29)
CREAT SERPL-MCNC: 0.91 MG/DL (ref 0.57–1)
DEPRECATED RDW RBC AUTO: 48.8 FL (ref 37–54)
EGFRCR SERPLBLD CKD-EPI 2021: 76.1 ML/MIN/1.73
EOSINOPHIL # BLD AUTO: 0.03 10*3/MM3 (ref 0–0.4)
EOSINOPHIL NFR BLD AUTO: 0.3 % (ref 0.3–6.2)
ERYTHROCYTE [DISTWIDTH] IN BLOOD BY AUTOMATED COUNT: 14.3 % (ref 12.3–15.4)
GLOBULIN UR ELPH-MCNC: 2.3 GM/DL
GLUCOSE SERPL-MCNC: 96 MG/DL (ref 65–99)
HCT VFR BLD AUTO: 34.1 % (ref 34–46.6)
HGB BLD-MCNC: 11.2 G/DL (ref 12–15.9)
IMM GRANULOCYTES # BLD AUTO: 0.05 10*3/MM3 (ref 0–0.05)
IMM GRANULOCYTES NFR BLD AUTO: 0.4 % (ref 0–0.5)
LYMPHOCYTES # BLD AUTO: 3.51 10*3/MM3 (ref 0.7–3.1)
LYMPHOCYTES NFR BLD AUTO: 31.3 % (ref 19.6–45.3)
MAGNESIUM SERPL-MCNC: 2.3 MG/DL (ref 1.6–2.6)
MCH RBC QN AUTO: 30.4 PG (ref 26.6–33)
MCHC RBC AUTO-ENTMCNC: 32.8 G/DL (ref 31.5–35.7)
MCV RBC AUTO: 92.7 FL (ref 79–97)
MONOCYTES # BLD AUTO: 0.96 10*3/MM3 (ref 0.1–0.9)
MONOCYTES NFR BLD AUTO: 8.5 % (ref 5–12)
NEUTROPHILS NFR BLD AUTO: 59 % (ref 42.7–76)
NEUTROPHILS NFR BLD AUTO: 6.62 10*3/MM3 (ref 1.7–7)
NRBC BLD AUTO-RTO: 0 /100 WBC (ref 0–0.2)
PLATELET # BLD AUTO: 181 10*3/MM3 (ref 140–450)
PMV BLD AUTO: 11.8 FL (ref 6–12)
POTASSIUM SERPL-SCNC: 3 MMOL/L (ref 3.5–5.2)
PROT SERPL-MCNC: 5.4 G/DL (ref 6–8.5)
RBC # BLD AUTO: 3.68 10*6/MM3 (ref 3.77–5.28)
SODIUM SERPL-SCNC: 144 MMOL/L (ref 136–145)
WBC NRBC COR # BLD AUTO: 11.23 10*3/MM3 (ref 3.4–10.8)

## 2025-04-07 PROCEDURE — 83735 ASSAY OF MAGNESIUM: CPT | Performed by: NURSE PRACTITIONER

## 2025-04-07 PROCEDURE — 80053 COMPREHEN METABOLIC PANEL: CPT | Performed by: NURSE PRACTITIONER

## 2025-04-07 PROCEDURE — 63710000001 PREDNISONE PER 1 MG: Performed by: NURSE PRACTITIONER

## 2025-04-07 PROCEDURE — 85025 COMPLETE CBC W/AUTO DIFF WBC: CPT | Performed by: NURSE PRACTITIONER

## 2025-04-07 RX ORDER — POTASSIUM CHLORIDE 1500 MG/1
40 TABLET, EXTENDED RELEASE ORAL EVERY 4 HOURS
Status: DISCONTINUED | OUTPATIENT
Start: 2025-04-07 | End: 2025-04-07 | Stop reason: HOSPADM

## 2025-04-07 RX ORDER — PREDNISONE 10 MG/1
TABLET ORAL
Qty: 6 TABLET | Refills: 0 | Status: SHIPPED | OUTPATIENT
Start: 2025-04-08 | End: 2025-04-12

## 2025-04-07 RX ORDER — METHIMAZOLE 5 MG/1
5 TABLET ORAL DAILY
Qty: 30 TABLET | Refills: 0 | Status: SHIPPED | OUTPATIENT
Start: 2025-04-07

## 2025-04-07 RX ORDER — ONDANSETRON 4 MG/1
4 TABLET, ORALLY DISINTEGRATING ORAL EVERY 8 HOURS PRN
Qty: 20 TABLET | Refills: 0 | Status: SHIPPED | OUTPATIENT
Start: 2025-04-07

## 2025-04-07 RX ORDER — POTASSIUM CHLORIDE 1500 MG/1
40 TABLET, EXTENDED RELEASE ORAL DAILY
Qty: 14 TABLET | Refills: 0 | Status: SHIPPED | OUTPATIENT
Start: 2025-04-07

## 2025-04-07 RX ADMIN — DULOXETINE 60 MG: 30 CAPSULE, DELAYED RELEASE ORAL at 08:53

## 2025-04-07 RX ADMIN — Medication 10 ML: at 08:58

## 2025-04-07 RX ADMIN — PREDNISONE 20 MG: 20 TABLET ORAL at 08:53

## 2025-04-07 RX ADMIN — PANTOPRAZOLE SODIUM 40 MG: 40 TABLET, DELAYED RELEASE ORAL at 05:47

## 2025-04-07 RX ADMIN — MUPIROCIN 1 APPLICATION: 20 OINTMENT TOPICAL at 12:14

## 2025-04-07 RX ADMIN — POTASSIUM CHLORIDE 40 MEQ: 1500 TABLET, EXTENDED RELEASE ORAL at 09:30

## 2025-04-07 RX ADMIN — Medication 10 ML: at 08:34

## 2025-04-07 RX ADMIN — BUPRENORPHINE HYDROCHLORIDE AND NALOXONE HYDROCHLORIDE DIHYDRATE 1 TABLET: 8; 2 TABLET SUBLINGUAL at 08:53

## 2025-04-07 RX ADMIN — Medication 10 ML: at 08:35

## 2025-04-07 RX ADMIN — POTASSIUM CHLORIDE 40 MEQ: 1500 TABLET, EXTENDED RELEASE ORAL at 00:13

## 2025-04-07 RX ADMIN — LEVETIRACETAM 1500 MG: 500 TABLET, FILM COATED ORAL at 08:53

## 2025-04-07 RX ADMIN — POTASSIUM CHLORIDE 40 MEQ: 1500 TABLET, EXTENDED RELEASE ORAL at 05:47

## 2025-04-07 NOTE — CASE MANAGEMENT/SOCIAL WORK
Case Management Discharge Note      Final Note: Home.      Selected Continued Care - Discharged on 4/7/2025 Admission date: 4/5/2025 - Discharge disposition: Home or Self Care       Transportation Services  Private: Car    Final Discharge Disposition Code: 01 - home or self-care

## 2025-04-07 NOTE — PLAN OF CARE
Goal Outcome Evaluation:      Pt status and vital signs stable entire shift. Pt alert, oriented, and on room air entire shift. Pt had no pain or complaints this shift. Pt slept for duration of shift, easily awoken when needed to perform nursing interventions, pt able to get some good rest this shift. Potassium replacement given, morning labs indicate additional potassium needed prior to discharge. Pt transported to  via RN transport safely, vital signs stable. Pt has expressed a desire to return home.

## 2025-04-07 NOTE — TELEPHONE ENCOUNTER
"Patient called because she wants to get HH set up after being discharged home from [facility], earlier today.   She said that she has a port from the hospital and she needs help around her house.   I told her that I can send in a referral to ambulatory case management to call her. I confirmed that her phone number is correct in the chart.   I sent in a referral for ambulatory CM.   Reason for Disposition   General information question, no triage required and triager able to answer question    Additional Information   Negative: [1] Caller is not with the adult (patient) AND [2] reporting urgent symptoms   Negative: Lab result questions   Negative: Medication questions   Negative: Caller can't be reached by phone   Negative: Caller has already spoken to PCP or another triager   Negative: RN needs further essential information from caller in order to complete triage   Negative: Requesting regular office appointment   Negative: [1] Caller requesting NON-URGENT health information AND [2] PCP's office is the best resource   Negative: Health Information question, no triage required and triager able to answer question    Answer Assessment - Initial Assessment Questions  1. REASON FOR CALL or QUESTION: \"What is your reason for calling today?\" or \"How can I best help you?\" or \"What question do you have that I can help answer?\"      Patient called because she wants to get HH set up after being discharged home from [facility], earlier today.    Protocols used: Information Only Call - No Triage-ADULT-    "

## 2025-04-07 NOTE — DISCHARGE SUMMARY
"[facility] Medicine Services  Discharge Summary    Date of Service: 2025  Patient Name: Xochitl Yao  : 1975  MRN: [id number]    Date of Admission: 2025  Discharge Diagnosis: Hypokalemia  Date of Discharge: 2025  Primary Care Physician: Arabella Hernandez APRN      Presenting Problem:   Hypokalemia [E87.6]  Weakness [R53.1]  Positive urine drug screen [R82.5]  Acute hypokalemia [E87.6]    Active and Resolved Hospital Problems:  Active Hospital Problems    Diagnosis POA    **Hypokalemia [E87.6] Yes    Acute hypokalemia [E87.6] Yes      Resolved Hospital Problems   No resolved problems to display.         Hospital Course     HPI:  Xochitl Yao is a 50 y.o. female with PMH of fibromyalgia, peripheral neuropathy, seizure disorder, substance abuse disorder, tobacco abuse disorder-current, hyperlipidemia, chronic low back pain and a fairly new diagnosis of hypothyroidism who presented to the ED today for evaluation of weakness and fatigue.  She reports that she has had progressive worsening of her symptoms for a week and came to the ED because she could no longer stand or walk due to the weakness.  She states that she also has severe bilateral lower extremity cramping which has been present at least since yesterday.  She reports she had an episode of nausea and emesis x 1 yesterday however she states that it occurred due to extreme pain and not due to GI issues.  She denies that she has had any diarrhea, melena, or hematochezia.  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  She has areas of excoriation on her face, back, and abdomen which she reports is due to a \"skin condition\" which is exacerbated by stress and has increased over the past week.  She states that she has been thirsty for the last few days and has been drinking at least 5 bottles of H2O daily.  She denies any routine use of laxatives or weight loss " "medications.  She was recently diagnosed with hypothyroidism and has been followed by endocrinology and started on Synthroid 5 days ago.     Evaluation in the emergency department most significantly revealed a potassium of 1.7.  The patient has no arrhythmias or chest pain.  She was admitted to the ICU due to severe hypokalemia  \"    Hospital Course:          Severe hypokalemia, etiology unclear  Hypophosphatemia  No recent GI losses  Patient denies routine alcohol ingestion  Cortisol level 28.20  Hemoglobin A1c 5.20  Urine Osmo 239  Urine potassium 20.3, urine sodium 20  Urine negative for glucose or ketones  Aldosterone level pending  Her potassium and phosphorus levels have improved today  Plan to d/c patient on oral potassium 40 meq daily with sodium phosphate BID x 14 days  I will refer patient to see Nephrology as an outpatient   She can follow-up with PCP in 1-2 weeks to monitor levels in the meantime      Hyperthyroidism  Seen by Endocrinologist who recommended patient start methimazole 5 mg daily and follow-up with Endocrinology as an outpatient      AST and alk phos elevated  Etiology unclear  Patient denies routine alcohol consumption  Trending down  Monitor as an outpatient by PCP     History of substance abuse  Patient denies IV drug use  Currently being treated with Suboxone     Presumed emphysema secondary to significant tobacco abuse history  Wheezing on initial examination  Continue tapered dose prednisone at d/c with prn inhaler      Tobacco abuse disorder, current  Nicotine patch ordered     Disposition:  patient is medically stable for d/c home today.  Follow-up closely with PCP, Endocrinologist, and Nephrology outpatient         Day of Discharge     Vital Signs:  Temp:  [97.3 °F (36.3 °C)-98.2 °F (36.8 °C)] 97.5 °F (36.4 °C)  Heart Rate:  [] 79  Resp:  [11-18] 13  BP: ()/(55-96) 122/84    Physical Exam:  Physical Exam   General: Sitting up in bed; NAD  Head/face:  multiple " excoriations over face  CV: RRR, S1-S2  Lungs: CTA bilaterally  Abdomen: soft, Nt, ND       Pertinent  and/or Most Recent Results     LAB RESULTS:      Lab 04/07/25 0414 04/05/25 2343 04/05/25  0826   WBC 11.23* 7.35 13.90*   HEMOGLOBIN 11.2* 13.8 14.5   HEMATOCRIT 34.1 41.7 42.7   PLATELETS 181 216 254   NEUTROS ABS 6.62 6.00 9.33*   IMMATURE GRANS (ABS) 0.05 0.03 0.06*   LYMPHS ABS 3.51* 1.17 2.80   MONOS ABS 0.96* 0.07* 1.08*   EOS ABS 0.03 0.01 0.54*   MCV 92.7 90.5 89.3         Lab 04/07/25 0414 04/06/25  1320 04/05/25 2343 04/05/25 1803 04/05/25  0826   SODIUM 144 140 142 145 141   POTASSIUM 3.0* 3.1* 2.1* 1.7* 1.7*   CHLORIDE 108* 102 102 103 99   CO2 25.7 24.3 27.3 27.9 27.1   ANION GAP 10.3 13.7 12.7 14.1 14.9   BUN 16 15 15 14 15   CREATININE 0.91 0.95 1.09* 1.12* 1.20*   EGFR 76.1 72.2 61.3 59.3* 54.6*   GLUCOSE 96 149* 130* 103* 104*   CALCIUM 8.3* 8.6 8.7 8.9 9.5   IONIZED CALCIUM  --   --   --  1.12*  --    MAGNESIUM 2.3 2.3 2.4 2.3 2.3   PHOSPHORUS  --   --  3.0 2.8 1.9*   HEMOGLOBIN A1C  --   --   --   --  5.20   TSH  --   --   --   --  0.022*         Lab 04/07/25 0414 04/05/25 2343 04/05/25 1803 04/05/25  0826   TOTAL PROTEIN 5.4* 6.5 6.5 7.3   ALBUMIN 3.1* 3.7 3.7 4.1   GLOBULIN 2.3 2.8 2.8 3.2   ALT (SGPT) 28 27 26 29   AST (SGOT) 53* 63* 65* 75*   BILIRUBIN 0.2 0.3 0.3 0.3   ALK PHOS 123* 159* 154* 169*                 Lab 04/05/25  1803   FOLATE 14.80         Brief Urine Lab Results  (Last result in the past 365 days)        Color   Clarity   Blood   Leuk Est   Nitrite   Protein   CREAT   Urine HCG        02/08/25 0848 Yellow   Clear   Negative   Negative   Negative   30 mg/dL (1+)                 Microbiology Results (last 10 days)       Procedure Component Value - Date/Time    Respiratory Panel PCR w/COVID-19(SARS-CoV-2) IVAN/JIM/FARNAZ/PAD/COR/JESSICA In-House, NP Swab in UTM/VTM, 2 HR TAT - Swab, Nasopharynx [[id number]]  (Normal) Collected: 04/05/25 1639    Lab Status: Final result Specimen:  Swab from Nasopharynx Updated: 04/05/25 1924     ADENOVIRUS, PCR Not Detected     Coronavirus 229E Not Detected     Coronavirus HKU1 Not Detected     Coronavirus NL63 Not Detected     Coronavirus OC43 Not Detected     COVID19 Not Detected     Human Metapneumovirus Not Detected     Human Rhinovirus/Enterovirus Not Detected     Influenza A PCR Not Detected     Influenza B PCR Not Detected     Parainfluenza Virus 1 Not Detected     Parainfluenza Virus 2 Not Detected     Parainfluenza Virus 3 Not Detected     Parainfluenza Virus 4 Not Detected     RSV, PCR Not Detected     Bordetella pertussis pcr Not Detected     Bordetella parapertussis PCR Not Detected     Chlamydophila pneumoniae PCR Not Detected     Mycoplasma pneumo by PCR Not Detected    Narrative:      In the setting of a positive respiratory panel with a viral infection PLUS a negative procalcitonin without other underlying concern for bacterial infection, consider observing off antibiotics or discontinuation of antibiotics and continue supportive care. If the respiratory panel is positive for atypical bacterial infection (Bordetella pertussis, Chlamydophila pneumoniae, or Mycoplasma pneumoniae), consider antibiotic de-escalation to target atypical bacterial infection.    MRSA Screen, PCR (Inpatient) - Swab, Nares [[id number]]  (Normal) Collected: 04/05/25 1309    Lab Status: Final result Specimen: Swab from Nares Updated: 04/05/25 1508     MRSA PCR No MRSA Detected    Narrative:      The negative predictive value of this diagnostic test is high and should only be used to consider de-escalating anti-MRSA therapy. A positive result may indicate colonization with MRSA and must be correlated clinically.    S. Pneumo Ag Urine or CSF - Urine, Urine, Clean Catch [[id number]]  (Normal) Collected: 04/05/25 0828    Lab Status: Final result Specimen: Urine, Clean Catch Updated: 04/05/25 1458     Strep Pneumo Ag Negative    Legionella Antigen, Urine - Urine, Urine, Clean  Catch [[id number]]  (Normal) Collected: 04/05/25 0828    Lab Status: Final result Specimen: Urine, Clean Catch Updated: 04/05/25 1458     LEGIONELLA ANTIGEN, URINE Negative            XR Spine Lumbar Complete 4+VW  Result Date: 4/5/2025  Impression: Impression: 1.No acute fracture or dislocation. 2.Increased stool burden. Electronically Signed: Agapito Manzanares MD  4/5/2025 10:10 AM EDT  Workstation ID: [id number]                  Labs Pending at Discharge:  Pending Results       Procedure [Order ID] Specimen - Date/Time    Aldosterone [[id number]] Collected: 04/05/25 2343    Specimen: Blood from Arm, Right Updated: 04/05/25 2348    Potassium [[id number]]     Specimen: Blood             Procedures Performed           Consults:   Consults       Date and Time Order Name Status Description    4/6/2025  3:55 PM Inpatient Hospitalist Consult      4/5/2025 12:11 PM Inpatient Endocrinology Consult Completed     4/5/2025 10:14 AM Hospitalist (on-call MD unless specified)                Discharge Details        Discharge Medications        New Medications        Instructions Start Date   methIMAzole 5 MG tablet  Commonly known as: TAPAZOLE   5 mg, Oral, Daily      ondansetron ODT 4 MG disintegrating tablet  Commonly known as: ZOFRAN-ODT   4 mg, Translingual, Every 8 Hours PRN      potassium & sodium phosphates 280-160-250 MG pack packet  Commonly known as: PHOS-NAK   2 packets, Oral, 2 Times Daily Before Meals      potassium chloride 20 MEQ CR tablet  Commonly known as: KLOR-CON M20   40 mEq, Oral, Daily      predniSONE 10 MG tablet  Commonly known as: DELTASONE   Take 2 tablets by mouth Daily for 2 days, THEN 1 tablet Daily for 2 days.   Start Date: April 8, 2025            Changes to Medications        Instructions Start Date   Vyvanse 60 MG capsule  Generic drug: lisdexamfetamine  What changed: how much to take   1 capsule, Oral, Daily             Continue These Medications        Instructions Start Date   albuterol sulfate   (90 Base) MCG/ACT inhaler  Commonly known as: PROVENTIL HFA;VENTOLIN HFA;PROAIR HFA   INHALE 1-2 PUFFS EVERY 4 TO 6 HOURS AS NEEDED      buprenorphine-naloxone 8-2 MG per SL tablet  Commonly known as: SUBOXONE   1 tablet, Daily      DULoxetine 60 MG capsule  Commonly known as: CYMBALTA   60 mg, Daily      estradiol 0.025 MG/24HR patch  Commonly known as: CLIMARA   1 patch, Weekly      gabapentin 800 MG tablet  Commonly known as: NEURONTIN   800 mg, Oral, Every 6 Hours      levETIRAcetam 750 MG tablet  Commonly known as: KEPPRA   TAKE TWO TABLETS BY MOUTH EVERY TWELVE HOURS      mirtazapine 45 MG tablet  Commonly known as: REMERON   45 mg, Every Night at Bedtime      OLANZapine 20 MG tablet  Commonly known as: zyPREXA   olanzapine 20 mg tablet   TAKE ONE TABLET BY MOUTH AT BEDTIME      omeprazole 40 MG capsule  Commonly known as: priLOSEC   1 capsule, Daily      promethazine 12.5 MG tablet  Commonly known as: PHENERGAN   TAKE ONE TABLET BY MOUTH THREE TIMES DAILY ALTERNATING WITH ZOFRAN AS NEEDED      rosuvastatin 40 MG tablet  Commonly known as: CRESTOR   40 mg, Every Night at Bedtime      SUMAtriptan 50 MG tablet  Commonly known as: IMITREX   TAKE ONE TABLET BY MOUTH AT ONSET of migraine. MAY REPEAT one time TWO hours LATER             Stop These Medications      amLODIPine 5 MG tablet  Commonly known as: NORVASC     ibuprofen 600 MG tablet  Commonly known as: ADVIL,MOTRIN              No Known Allergies      Discharge Disposition:   Home or Self Care    Diet:  Hospital:  Diet Order   Procedures    Diet: Regular/House; Fluid Consistency: Thin (IDDSI 0)         Discharge Activity:         CODE STATUS:  Code Status and Medical Interventions: CPR (Attempt to Resuscitate); Full Support   Ordered at: 04/05/25 1210     Code Status (Patient has no pulse and is not breathing):    CPR (Attempt to Resuscitate)     Medical Interventions (Patient has pulse or is breathing):    Full Support         Future Appointments   Date  Time Provider Department [place]   5/13/2025  8:30 AM Seipel, Joseph F, MD [facility]       Additional Instructions for the Follow-ups that You Need to Schedule       Discharge Follow-up with PCP   As directed       Currently Documented PCP:    Arabella Hernandez APRN    PCP Phone Number:    [phone]     Follow Up Details: IN 1-2 weeks to monitor potassium and phosphate levels        Discharge Follow-up with Specialty: follow-up with Endocrinologist Dr Bush in 2-3 weeks or as previously scheduled   As directed      Specialty: follow-up with Endocrinologist Dr Bush in 2-3 weeks or as previously scheduled                Time spent on Discharge including face to face service:  35 minutes     Signature: Electronically signed by Blane Mace DO, 04/07/25, 10:58 EDT.  [facility] Hospitalist Team

## 2025-04-07 NOTE — PAYOR COMM NOTE
"This is discharge notification for Xochitl Yao  Reference/Auth # [id number]   Pt discharged on 4/7/25    Pending IP auth     Antonia Souza RN, BSN  Utilization Review Nurse  [facility]  Direct & confidential phone # [phone]  Fax # [phone]      Xochitl Yao (50 y.o. Female)       Date of Birth   1975    Social Security Number       Address   [address] [place] [address] [place] [facility]    Home Phone   [phone]    MRN   [id number]       Sabianism   None    Marital Status                               Admission Date   4/5/2025    Admission Type   Emergency    Admitting Provider   Ld Metz MD    Attending Provider       Department, Room/Bed   [facility] MEDICAL INPATIENT, 368/1       Discharge Date   4/7/2025    Discharge Disposition   Home or Self Care    Discharge Destination                                 Attending Provider: (none)   Allergies: No Known Allergies    Isolation: None   Infection: None   Code Status: Prior    Ht: 170.2 cm (67\")   Wt: 74.7 kg (164 lb 10.9 oz)    Admission Cmt: None   Principal Problem: Hypokalemia [E87.6]                   Active Insurance as of 4/5/2025       Primary Coverage       Payor Plan Insurance Group Employer/Plan Group    [employer] [place] [id number]       Payor Plan Address Payor Plan Phone Number Payor Plan Fax Number Effective Dates    MAIL STOP:   11/1/2017 - None Entered    [address] [address]       [facility] [address]         Subscriber Name Subscriber Birth Date Member ID       XOCHITL YAO 1975 [id number]                     Emergency Contacts        (Rel.) Home Phone Work Phone Mobile Phone    QUINTON IRIZARRY (Mother) -- -- [phone]                 Discharge Summary        Blane Mace DO at 04/07/25 [address] [place] [address] Medicine Services  Discharge Summary    Date of Service: 4/7/2025  Patient Name: Xochitl VO " "Maximilian  : 1975  MRN: [id number]    Date of Admission: 2025  Discharge Diagnosis: Hypokalemia  Date of Discharge: 2025  Primary Care Physician: Arabella Hernandez APRN      Presenting Problem:   Hypokalemia [E87.6]  Weakness [R53.1]  Positive urine drug screen [R82.5]  Acute hypokalemia [E87.6]    Active and Resolved Hospital Problems:  Active Hospital Problems    Diagnosis POA    **Hypokalemia [E87.6] Yes    Acute hypokalemia [E87.6] Yes      Resolved Hospital Problems   No resolved problems to display.         Hospital Course     HPI:  Xochitl Yao is a 50 y.o. female with PMH of fibromyalgia, peripheral neuropathy, seizure disorder, substance abuse disorder, tobacco abuse disorder-current, hyperlipidemia, chronic low back pain and a fairly new diagnosis of hypothyroidism who presented to the ED today for evaluation of weakness and fatigue.  She reports that she has had progressive worsening of her symptoms for a week and came to the ED because she could no longer stand or walk due to the weakness.  She states that she also has severe bilateral lower extremity cramping which has been present at least since yesterday.  She reports she had an episode of nausea and emesis x 1 yesterday however she states that it occurred due to extreme pain and not due to GI issues.  She denies that she has had any diarrhea, melena, or hematochezia.  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  She has areas of excoriation on her face, back, and abdomen which she reports is due to a \"skin condition\" which is exacerbated by stress and has increased over the past week.  She states that she has been thirsty for the last few days and has been drinking at least 5 bottles of H2O daily.  She denies any routine use of laxatives or weight loss medications.  She was recently diagnosed with hypothyroidism and has been followed by endocrinology and started on " "Synthroid 5 days ago.     Evaluation in the emergency department most significantly revealed a potassium of 1.7.  The patient has no arrhythmias or chest pain.  She was admitted to the ICU due to severe hypokalemia  \"    Hospital Course:          Severe hypokalemia, etiology unclear  Hypophosphatemia  No recent GI losses  Patient denies routine alcohol ingestion  Cortisol level 28.20  Hemoglobin A1c 5.20  Urine Osmo 239  Urine potassium 20.3, urine sodium 20  Urine negative for glucose or ketones  Aldosterone level pending  Her potassium and phosphorus levels have improved today  Plan to d/c patient on oral potassium 40 meq daily with sodium phosphate BID x 14 days  I will refer patient to see Nephrology as an outpatient   She can follow-up with PCP in 1-2 weeks to monitor levels in the meantime      Hyperthyroidism  Seen by Endocrinologist who recommended patient start methimazole 5 mg daily and follow-up with Endocrinology as an outpatient      AST and alk phos elevated  Etiology unclear  Patient denies routine alcohol consumption  Trending down  Monitor as an outpatient by PCP     History of substance abuse  Patient denies IV drug use  Currently being treated with Suboxone     Presumed emphysema secondary to significant tobacco abuse history  Wheezing on initial examination  Continue tapered dose prednisone at d/c with prn inhaler      Tobacco abuse disorder, current  Nicotine patch ordered     Disposition:  patient is medically stable for d/c home today.  Follow-up closely with PCP, Endocrinologist, and Nephrology outpatient         Day of Discharge     Vital Signs:  Temp:  [97.3 °F (36.3 °C)-98.2 °F (36.8 °C)] 97.5 °F (36.4 °C)  Heart Rate:  [] 79  Resp:  [11-18] 13  BP: ()/(55-96) 122/84    Physical Exam:  Physical Exam   General: Sitting up in bed; NAD  Head/face:  multiple excoriations over face  CV: RRR, S1-S2  Lungs: CTA bilaterally  Abdomen: soft, Nt, ND       Pertinent  and/or Most Recent " Results     LAB RESULTS:      Lab 04/07/25 0414 04/05/25 2343 04/05/25  0826   WBC 11.23* 7.35 13.90*   HEMOGLOBIN 11.2* 13.8 14.5   HEMATOCRIT 34.1 41.7 42.7   PLATELETS 181 216 254   NEUTROS ABS 6.62 6.00 9.33*   IMMATURE GRANS (ABS) 0.05 0.03 0.06*   LYMPHS ABS 3.51* 1.17 2.80   MONOS ABS 0.96* 0.07* 1.08*   EOS ABS 0.03 0.01 0.54*   MCV 92.7 90.5 89.3         Lab 04/07/25 0414 04/06/25  1320 04/05/25 2343 04/05/25 1803 04/05/25  0826   SODIUM 144 140 142 145 141   POTASSIUM 3.0* 3.1* 2.1* 1.7* 1.7*   CHLORIDE 108* 102 102 103 99   CO2 25.7 24.3 27.3 27.9 27.1   ANION GAP 10.3 13.7 12.7 14.1 14.9   BUN 16 15 15 14 15   CREATININE 0.91 0.95 1.09* 1.12* 1.20*   EGFR 76.1 72.2 61.3 59.3* 54.6*   GLUCOSE 96 149* 130* 103* 104*   CALCIUM 8.3* 8.6 8.7 8.9 9.5   IONIZED CALCIUM  --   --   --  1.12*  --    MAGNESIUM 2.3 2.3 2.4 2.3 2.3   PHOSPHORUS  --   --  3.0 2.8 1.9*   HEMOGLOBIN A1C  --   --   --   --  5.20   TSH  --   --   --   --  0.022*         Lab 04/07/25 0414 04/05/25 2343 04/05/25 1803 04/05/25  0826   TOTAL PROTEIN 5.4* 6.5 6.5 7.3   ALBUMIN 3.1* 3.7 3.7 4.1   GLOBULIN 2.3 2.8 2.8 3.2   ALT (SGPT) 28 27 26 29   AST (SGOT) 53* 63* 65* 75*   BILIRUBIN 0.2 0.3 0.3 0.3   ALK PHOS 123* 159* 154* 169*                 Lab 04/05/25  1803   FOLATE 14.80         Brief Urine Lab Results  (Last result in the past 365 days)        Color   Clarity   Blood   Leuk Est   Nitrite   Protein   CREAT   Urine HCG        02/08/25 0848 Yellow   Clear   Negative   Negative   Negative   30 mg/dL (1+)                 Microbiology Results (last 10 days)       Procedure Component Value - Date/Time    Respiratory Panel PCR w/COVID-19(SARS-CoV-2) IVAN/JIM/FARNAZ/PAD/COR/JESSICA In-House, NP Swab in UTM/VTM, 2 HR TAT - Swab, Nasopharynx [[id number]]  (Normal) Collected: 04/05/25 1639    Lab Status: Final result Specimen: Swab from Nasopharynx Updated: 04/05/25 1924     ADENOVIRUS, PCR Not Detected     Coronavirus 229E Not Detected      Coronavirus HKU1 Not Detected     Coronavirus NL63 Not Detected     Coronavirus OC43 Not Detected     COVID19 Not Detected     Human Metapneumovirus Not Detected     Human Rhinovirus/Enterovirus Not Detected     Influenza A PCR Not Detected     Influenza B PCR Not Detected     Parainfluenza Virus 1 Not Detected     Parainfluenza Virus 2 Not Detected     Parainfluenza Virus 3 Not Detected     Parainfluenza Virus 4 Not Detected     RSV, PCR Not Detected     Bordetella pertussis pcr Not Detected     Bordetella parapertussis PCR Not Detected     Chlamydophila pneumoniae PCR Not Detected     Mycoplasma pneumo by PCR Not Detected    Narrative:      In the setting of a positive respiratory panel with a viral infection PLUS a negative procalcitonin without other underlying concern for bacterial infection, consider observing off antibiotics or discontinuation of antibiotics and continue supportive care. If the respiratory panel is positive for atypical bacterial infection (Bordetella pertussis, Chlamydophila pneumoniae, or Mycoplasma pneumoniae), consider antibiotic de-escalation to target atypical bacterial infection.    MRSA Screen, PCR (Inpatient) - Swab, Nares [[id number]]  (Normal) Collected: 04/05/25 1309    Lab Status: Final result Specimen: Swab from Nares Updated: 04/05/25 1508     MRSA PCR No MRSA Detected    Narrative:      The negative predictive value of this diagnostic test is high and should only be used to consider de-escalating anti-MRSA therapy. A positive result may indicate colonization with MRSA and must be correlated clinically.    S. Pneumo Ag Urine or CSF - Urine, Urine, Clean Catch [[id number]]  (Normal) Collected: 04/05/25 0828    Lab Status: Final result Specimen: Urine, Clean Catch Updated: 04/05/25 1458     Strep Pneumo Ag Negative    Legionella Antigen, Urine - Urine, Urine, Clean Catch [[id number]]  (Normal) Collected: 04/05/25 0828    Lab Status: Final result Specimen: Urine, Clean Catch Updated:  04/05/25 1458     LEGIONELLA ANTIGEN, URINE Negative            XR Spine Lumbar Complete 4+VW  Result Date: 4/5/2025  Impression: Impression: 1.No acute fracture or dislocation. 2.Increased stool burden. Electronically Signed: Agapito Manzanares MD  4/5/2025 10:10 AM EDT  Workstation ID: [id number]                  Labs Pending at Discharge:  Pending Results       Procedure [Order ID] Specimen - Date/Time    Aldosterone [[id number]] Collected: 04/05/25 2343    Specimen: Blood from Arm, Right Updated: 04/05/25 2348    Potassium [[id number]]     Specimen: Blood             Procedures Performed           Consults:   Consults       Date and Time Order Name Status Description    4/6/2025  3:55 PM Inpatient Hospitalist Consult      4/5/2025 12:11 PM Inpatient Endocrinology Consult Completed     4/5/2025 10:14 AM Hospitalist (on-call MD unless specified)                Discharge Details        Discharge Medications        New Medications        Instructions Start Date   methIMAzole 5 MG tablet  Commonly known as: TAPAZOLE   5 mg, Oral, Daily      ondansetron ODT 4 MG disintegrating tablet  Commonly known as: ZOFRAN-ODT   4 mg, Translingual, Every 8 Hours PRN      potassium & sodium phosphates 280-160-250 MG pack packet  Commonly known as: PHOS-NAK   2 packets, Oral, 2 Times Daily Before Meals      potassium chloride 20 MEQ CR tablet  Commonly known as: KLOR-CON M20   40 mEq, Oral, Daily      predniSONE 10 MG tablet  Commonly known as: DELTASONE   Take 2 tablets by mouth Daily for 2 days, THEN 1 tablet Daily for 2 days.   Start Date: April 8, 2025            Changes to Medications        Instructions Start Date   Vyvanse 60 MG capsule  Generic drug: lisdexamfetamine  What changed: how much to take   1 capsule, Oral, Daily             Continue These Medications        Instructions Start Date   albuterol sulfate  (90 Base) MCG/ACT inhaler  Commonly known as: PROVENTIL HFA;VENTOLIN HFA;PROAIR HFA   INHALE 1-2 PUFFS EVERY 4 TO 6  HOURS AS NEEDED      buprenorphine-naloxone 8-2 MG per SL tablet  Commonly known as: SUBOXONE   1 tablet, Daily      DULoxetine 60 MG capsule  Commonly known as: CYMBALTA   60 mg, Daily      estradiol 0.025 MG/24HR patch  Commonly known as: CLIMARA   1 patch, Weekly      gabapentin 800 MG tablet  Commonly known as: NEURONTIN   800 mg, Oral, Every 6 Hours      levETIRAcetam 750 MG tablet  Commonly known as: KEPPRA   TAKE TWO TABLETS BY MOUTH EVERY TWELVE HOURS      mirtazapine 45 MG tablet  Commonly known as: REMERON   45 mg, Every Night at Bedtime      OLANZapine 20 MG tablet  Commonly known as: zyPREXA   olanzapine 20 mg tablet   TAKE ONE TABLET BY MOUTH AT BEDTIME      omeprazole 40 MG capsule  Commonly known as: priLOSEC   1 capsule, Daily      promethazine 12.5 MG tablet  Commonly known as: PHENERGAN   TAKE ONE TABLET BY MOUTH THREE TIMES DAILY ALTERNATING WITH ZOFRAN AS NEEDED      rosuvastatin 40 MG tablet  Commonly known as: CRESTOR   40 mg, Every Night at Bedtime      SUMAtriptan 50 MG tablet  Commonly known as: IMITREX   TAKE ONE TABLET BY MOUTH AT ONSET of migraine. MAY REPEAT one time TWO hours LATER             Stop These Medications      amLODIPine 5 MG tablet  Commonly known as: NORVASC     ibuprofen 600 MG tablet  Commonly known as: ADVIL,MOTRIN              No Known Allergies      Discharge Disposition:   Home or Self Care    Diet:  Hospital:  Diet Order   Procedures    Diet: Regular/House; Fluid Consistency: Thin (IDDSI 0)         Discharge Activity:         CODE STATUS:  Code Status and Medical Interventions: CPR (Attempt to Resuscitate); Full Support   Ordered at: 04/05/25 1210     Code Status (Patient has no pulse and is not breathing):    CPR (Attempt to Resuscitate)     Medical Interventions (Patient has pulse or is breathing):    Full Support         Future Appointments   Date Time Provider Department Center   5/13/2025  8:30 AM Seipel, Joseph F, MD [facility]       Additional  Instructions for the Follow-ups that You Need to Schedule       Discharge Follow-up with PCP   As directed       Currently Documented PCP:    Arabella Hernandez APRN    PCP Phone Number:    [phone]     Follow Up Details: IN 1-2 weeks to monitor potassium and phosphate levels        Discharge Follow-up with Specialty: follow-up with Endocrinologist Dr Bush in 2-3 weeks or as previously scheduled   As directed      Specialty: follow-up with Endocrinologist Dr Bush in 2-3 weeks or as previously scheduled                Time spent on Discharge including face to face service:  35 minutes     Signature: Electronically signed by Blane Mace DO, 04/07/25, 10:58 EDT.  [facility] Hospitalist Team    Electronically signed by Blane Mace DO at 04/07/25 7775

## 2025-04-07 NOTE — CASE MANAGEMENT/SOCIAL WORK
Discharge Planning Assessment   Matteo     Patient Name: Xochitl Yao  MRN: [id number]  Today's Date: 4/7/2025    Admit Date: 4/5/2025    Plan: Routine home.   Discharge Needs Assessment       Row Name 04/07/25 1343       Living Environment    People in Home alone    Current Living Arrangements apartment    Potentially Unsafe Housing Conditions none    In the past 12 months has the electric, gas, oil, or water company threatened to shut off services in your home? No    Primary Care Provided by self    Provides Primary Care For no one    Family Caregiver if Needed other relative(s)    Family Caregiver Names Ex father in law-Huber, Sister    Quality of Family Relationships helpful;involved;supportive    Able to Return to Prior Arrangements yes       Resource/Environmental Concerns    Resource/Environmental Concerns none    Transportation Concerns no car       Transportation Needs    In the past 12 months, has lack of transportation kept you from medical appointments or from getting medications? no    In the past 12 months, has lack of transportation kept you from meetings, work, or from getting things needed for daily living? No       Food Insecurity    Within the past 12 months, you worried that your food would run out before you got the money to buy more. Never true    Within the past 12 months, the food you bought just didn't last and you didn't have money to get more. Never true       Transition Planning    Patient/Family Anticipates Transition to home    Patient/Family Anticipated Services at Transition none    Transportation Anticipated family or friend will provide;health plan transportation       Discharge Needs Assessment    Readmission Within the Last 30 Days no previous admission in last 30 days    Equipment Currently Used at Home none    Concerns to be Addressed denies needs/concerns at this time;no discharge needs identified    Do you want help finding or keeping work or a job? Patient declined    Do  you want help with school or training? For example, starting or completing job training or getting a high school diploma, GED or equivalent No    Anticipated Changes Related to Illness none    Equipment Needed After Discharge none    Provided Post Acute Provider List? N/A    Provided Post Acute Provider Quality & Resource List? N/A                   Discharge Plan       Row Name 04/07/25 4893       Plan    Plan Routine home.    Patient/Family in Agreement with Plan yes    Plan Comments Pt with discharge orders in and nursing reviewing paperwork. CM verified that pt lives at home alone, does not drive because she doesn't have a car, but is independent with ADL's. PCP and pharmacy ([facility] [place]) confirmed. Pt denies DME use. Ex father-in-law Huber is picking pt up.              Demographic Summary       Row Name 04/07/25 7855       General Information    Admission Type inpatient    Arrived From emergency department    Referral Source admission list    Reason for Consult care coordination/care conference;discharge planning    Preferred Language English       Contact Information    Permission Granted to Share Info With                    Functional Status       Row Name 04/07/25 6016       Functional Status    Usual Activity Tolerance good    Current Activity Tolerance good       Functional Status, IADL    Medications independent    Meal Preparation independent    Housekeeping independent    Laundry independent    Shopping independent    If for any reason you need help with day-to-day activities such as bathing, preparing meals, shopping, managing finances, etc., do you get the help you need? I don't need any help             Megan Naegele, RN     Office Phone: [phone]  Office Cell: [phone]

## 2025-04-07 NOTE — PAYOR COMM NOTE
"CLINICALS ATTACHED FOR INPATIENT ADMISSION ON 4/5/25  --------------------------------------------------------------------------------------------    AUTHORIZATION PENDING:  PLEASE FAX OR CALL DETERMINATION TO CONTACT BELOW:    THANK YOU,    Kyra Granda RN    Utilization Review    [facility]    Phone: [phone]    Fax: [phone]  --------------------------------  NPI: [id number]    Tax ID: [id number]  -------------------------------  Xochitl Yao (50 y.o. Female)       Date of Birth   1975    Social Security Number       Address   [address] [place] [address] [place] [facility]    Home Phone   [phone]    N   [id number]       Baptism   None    Marital Status                               Admission Date   4/5/2025    Admission Type   Emergency    Admitting Provider   Ld Metz MD    Attending Provider   Blane Mace DO    Department, Room/Bed   [facility] MEDICAL INPATIENT, 368/1       Discharge Date       Discharge Disposition       Discharge Destination                                 Attending Provider: Blane Mace DO    Allergies: No Known Allergies    Isolation: None   Infection: None   Code Status: CPR    Ht: 170.2 cm (67\")   Wt: 74.7 kg (164 lb 10.9 oz)    Admission Cmt: None   Principal Problem: Hypokalemia [E87.6]                   Active Insurance as of 4/5/2025       Primary Coverage       Payor Plan Insurance Group Employer/Plan Group    [employer] [place] [employer] - [employer] [id number]       Payor Plan Address Payor Plan Phone Number Payor Plan Fax Number Effective Dates    MAIL STOP:   11/1/2017 - None Entered    [address] [address]       [facility] [address]         Subscriber Name Subscriber Birth Date Member ID       XOCHITL YAO 1975 [id number]                     Emergency Contacts        (Rel.) Home Phone Work Phone Mobile Phone    QUINTON IRIZARRY (Mother) -- -- [phone]      "            History & Physical        Day, Elizabeth GALE ALEYDA at 25 1201       Attestation signed by Ld Metz MD at 25 5699      I have reviewed this documentation and agree.                        Critical Care History and Physical     Xochitl Yao : 1975 MRN:[id number] LOS:0 ROOM: [facility]     Reason for admission: Hypokalemia     Assessment / Plan     Severe hypokalemia, etiology unclear  Hypophosphatemia  No recent GI losses  Patient denies routine alcohol ingestion  Aggressive electrolyte replacement  Cortisol level 28.20  Hemoglobin A1c 5.20  Urine Osmo 239  Urine potassium 20.3, urine sodium 20  Urine negative for glucose or ketones  Aldosterone level in the morning  Outpatient medications reviewed    Hypothyroidism  Continue Synthroid  Consult endocrinology    AST and alk phos elevated  Etiology unclear  Patient denies routine alcohol consumption  Monitor and trend    History of substance abuse  Patient denies IV drug use  Currently being treated with Suboxone    Tobacco abuse disorder, current  Nicotine patch ordered      Code Status (Patient has no pulse and is not breathing): CPR (Attempt to Resuscitate)  Medical Interventions (Patient has pulse or is breathing): Full Support       Nutrition:   Diet: Regular/House; Fluid Consistency: Thin (IDDSI 0)     VTE Prophylaxis:  Pharmacologic VTE prophylaxis orders are present.         History of Present illness     Xochitl Yao is a 50 y.o. female with PMH of fibromyalgia, peripheral neuropathy, seizure disorder, substance abuse disorder, tobacco abuse disorder-current, hyperlipidemia, chronic low back pain and a fairly new diagnosis of hypothyroidism who presented to the ED today for evaluation of weakness and fatigue.  She reports that she has had progressive worsening of her symptoms for a week and came to the ED because she could no longer stand or walk due to the weakness.  She states that she also has severe bilateral lower extremity  "cramping which has been present at least since yesterday.  She reports she had an episode of nausea and emesis x 1 yesterday however she states that it occurred due to extreme pain and not due to GI issues.  She denies that she has had any diarrhea, melena, or hematochezia.  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  She has areas of excoriation on her face, back, and abdomen which she reports is due to a \"skin condition\" which is exacerbated by stress and has increased over the past week.  She states that she has been thirsty for the last few days and has been drinking at least 5 bottles of H2O daily.  She denies any routine use of laxatives or weight loss medications.  She was recently diagnosed with hypothyroidism and has been followed by endocrinology and started on Synthroid 5 days ago.    Evaluation in the emergency department most significantly revealed a potassium of 1.7.  The patient has no arrhythmias or chest pain.  She was admitted to the ICU due to severe hypokalemia    ACP: No ACP documentations on file.  The patient's  is her mother Majo Carney    Patient was seen and examined on 04/05/25 at 16:07 EDT .      Past Medical/Surgical/Social/Family History & Allergies     Past Medical History:   Diagnosis Date    Fibromyalgia     Peripheral neuropathy     Seizure     Seizures       Past Surgical History:   Procedure Laterality Date    HYSTERECTOMY        Social History     Socioeconomic History    Marital status:    Tobacco Use    Smoking status: Every Day     Current packs/day: 1.50     Average packs/day: 1.5 packs/day for 30.0 years (45.0 ttl pk-yrs)     Types: Cigarettes    Smokeless tobacco: Never   Vaping Use    Vaping status: Never Used   Substance and Sexual Activity    Alcohol use: Not Currently    Drug use: Yes     Types: Marijuana     Comment: once a day    Sexual activity: Defer      Family History "   Problem Relation Age of Onset    Migraines Mother     Dementia Mother       No Known Allergies   Social Drivers of Health     Tobacco Use: High Risk (4/5/2025)    Patient History     Smoking Tobacco Use: Every Day     Smokeless Tobacco Use: Never     Passive Exposure: Not on file   Alcohol Use: Not At Risk (4/5/2025)    AUDIT-C     Frequency of Alcohol Consumption: Never     Average Number of Drinks: Patient does not drink     Frequency of Binge Drinking: Never   Financial Resource Strain: Not on file   Food Insecurity: Not on file   Transportation Needs: Not on file   Physical Activity: Not on file   Stress: Not on file   Social Connections: Not on file   Interpersonal Safety: Not At Risk (4/5/2025)    Abuse Screen     Unsafe at Home or Work/School: no     Feels Threatened by Someone?: no     Does Anyone Keep You from Contacting Others or Doint Things Outside the Home?: no     Physical Sign of Abuse Present: no   Depression: Not on file   Housing Stability: Unknown (4/5/2025)    Housing Stability     Current Living Arrangements: apartment     Potentially Unsafe Housing Conditions: Not on file   Utilities: Not on file   Health Literacy: Not on file   Employment: Not on file   Disabilities: Not At Risk (4/5/2025)    Disabilities     Concentrating, Remembering, or Making Decisions Difficulty: no     Doing Errands Independently Difficulty: no        Home Medications     Prior to Admission medications    Medication Sig Start Date End Date Taking? Authorizing Provider   albuterol sulfate  (90 Base) MCG/ACT inhaler INHALE 1-2 PUFFS EVERY 4 TO 6 HOURS AS NEEDED    Hao Ortez MD   amLODIPine (NORVASC) 5 MG tablet Take 1 tablet by mouth Daily. 12/7/23   Hao Ortez MD   buprenorphine-naloxone (SUBOXONE) 8-2 MG per SL tablet Place 1 tablet under the tongue Daily.    Hao Ortez MD   DULoxetine (CYMBALTA) 60 MG capsule Take 1 capsule by mouth Daily.    Hao Ortez MD    gabapentin (NEURONTIN) 800 MG tablet TAKE ONE TABLET BY MOUTH FOUR TIMES DAILY 4/2/25   Flor Hardwick APRN   ibuprofen (ADVIL,MOTRIN) 600 MG tablet Take 1 tablet by mouth Every 6 (Six) Hours. 11/27/23   Hao Ortez MD   levETIRAcetam (KEPPRA) 750 MG tablet TAKE TWO TABLETS BY MOUTH EVERY TWELVE HOURS 3/7/25   Seipel, Joseph F, MD   mirtazapine (REMERON) 45 MG tablet Take 1 tablet by mouth every night at bedtime.    Hao Ortez MD   OLANZapine (zyPREXA) 20 MG tablet olanzapine 20 mg tablet   TAKE ONE TABLET BY MOUTH AT BEDTIME    Hao Ortez MD   omeprazole (priLOSEC) 40 MG capsule Take 1 capsule by mouth Daily. 11/22/23   Hao Ortez MD   promethazine (PHENERGAN) 12.5 MG tablet TAKE ONE TABLET BY MOUTH THREE TIMES DAILY ALTERNATING WITH ZOFRAN AS NEEDED 12/11/23   Hao Ortez MD   rosuvastatin (CRESTOR) 40 MG tablet Take 1 tablet by mouth every night at bedtime. 12/12/23   Hao Ortez MD   SUMAtriptan (IMITREX) 50 MG tablet TAKE ONE TABLET BY MOUTH AT ONSET of migraine. MAY REPEAT one time TWO hours LATER 11/25/24   Flor Hardwick APRN   Vyvanse 60 MG capsule Take 1 capsule by mouth Daily 11/17/23   Hao Ortez MD        Objective / Physical Exam     Vital signs:  Temp: 98.1 °F (36.7 °C)  BP: 127/88  Heart Rate: 90  Resp: 10  SpO2: 99 %  Weight: 79.2 kg (174 lb 9.7 oz)    Admission Weight: Weight: 79.4 kg (175 lb)    Physical Exam  Vitals and nursing note reviewed.   Constitutional:       General: She is not in acute distress.     Appearance: She is ill-appearing.      Comments: Chronically ill appearing   HENT:      Head: Normocephalic and atraumatic.      Right Ear: External ear normal.      Left Ear: External ear normal.      Nose: Nose normal.      Mouth/Throat:      Mouth: Mucous membranes are moist.   Eyes:      General: No scleral icterus.     Conjunctiva/sclera: Conjunctivae normal.      Pupils: Pupils are equal, round,  and reactive to light.   Cardiovascular:      Rate and Rhythm: Regular rhythm.      Heart sounds: Normal heart sounds, S1 normal and S2 normal. No murmur heard.     Comments: SR  Pulmonary:      Effort: Pulmonary effort is normal. No respiratory distress.      Breath sounds: Wheezing present. No rhonchi.   Abdominal:      General: There is no distension.      Palpations: Abdomen is soft.      Tenderness: There is no abdominal tenderness. There is no guarding.   Musculoskeletal:      Cervical back: Neck supple. No rigidity.      Right lower leg: No edema.      Left lower leg: No edema.   Skin:     General: Skin is warm and dry.      Comments: Profuse dried red lesions on the face  Scattered lesions on the upper back and on the abdomen   Neurological:      General: No focal deficit present.      Mental Status: She is alert and oriented to person, place, and time.   Psychiatric:      Comments: Flat affect          Labs     Results from last 7 days   Lab Units 04/05/25  0826   WBC 10*3/mm3 13.90*   HEMOGLOBIN g/dL 14.5   HEMATOCRIT % 42.7   PLATELETS 10*3/mm3 254      Results from last 7 days   Lab Units 04/05/25  0826   SODIUM mmol/L 141   POTASSIUM mmol/L 1.7*   CHLORIDE mmol/L 99   CO2 mmol/L 27.1   ANION GAP mmol/L 14.9   BUN mg/dL 15   CREATININE mg/dL 1.20*   GLUCOSE mg/dL 104*   PHOSPHORUS mg/dL 1.9*   MAGNESIUM mg/dL 2.3   ALT (SGPT) U/L 29   AST (SGOT) U/L 75*   ALK PHOS U/L 169*            Imaging     Chest X ray: CXR not indicated so far this admission    EKG: My independent evaluation showed sinus rhythm, no ST -T changes    Current Medications     Scheduled Meds:  enoxaparin sodium, 40 mg, Subcutaneous, Daily  methIMAzole, 5 mg, Oral, Daily  mupirocin, 1 Application, Each Nare, BID  predniSONE, 30 mg, Oral, Daily   Followed by  [START ON 4/7/2025] predniSONE, 20 mg, Oral, Daily   Followed by  [START ON 4/9/2025] predniSONE, 10 mg, Oral, Daily  sodium chloride, 10 mL, Intravenous, Q12H         Continuous  Infusions:          ALEYDA Cedeño   Critical Care  04/05/25   16:07 EDT     Electronically signed by Ld Metz MD at 04/06/25 0839       Vital Signs (last 2 days)       Date/Time Temp Temp src Pulse Resp BP Patient Position SpO2    04/07/25 0529 97.5 (36.4) Oral 79 13 122/84 Lying 97    04/07/25 0500 -- -- 73 -- 91/62 -- 94    04/07/25 0400 97.8 (36.6) Oral 68 11 94/60 Lying 93    04/07/25 0300 -- -- 76 -- 94/59 -- 93    04/07/25 0200 -- -- 79 -- 107/62 -- 93    04/07/25 0100 -- -- 77 -- 96/59 -- 91    04/07/25 0000 97.5 (36.4) Oral 73 13 90/56 Lying 93    04/06/25 2300 -- -- 75 -- 97/69 -- 93    04/06/25 2200 -- -- 74 -- 105/72 -- 93    04/06/25 2100 -- -- 75 -- 97/63 -- 92    04/06/25 2000 97.3 (36.3) Oral 78 18 90/55 Lying 92    04/06/25 1900 -- -- 84 -- 102/64 -- 94    04/06/25 1800 -- -- 75 -- 94/70 -- 92    04/06/25 1700 -- -- 77 -- 109/74 -- 93    04/06/25 1600 -- -- 85 14 99/63 -- 95    04/06/25 1530 98.1 (36.7) Oral -- -- -- -- --    04/06/25 1500 -- -- 98 -- 126/87 -- 95    04/06/25 1400 -- -- 93 -- 121/74 -- 95    04/06/25 1300 -- -- 101 -- 133/96 -- 97    04/06/25 1200 -- -- 106 -- 120/91 -- 94    04/06/25 1100 98.2 (36.8) Oral 90 11 126/83 -- 95    04/06/25 1000 -- -- 104 -- 127/88 -- 96    04/06/25 0900 -- -- 90 -- 123/86 -- 99    04/06/25 0800 -- -- 93 15 133/88 -- 97    04/06/25 0722 97.8 (36.6) Oral -- -- -- -- --    04/06/25 0700 -- -- 71 -- 97/62 -- 92    04/06/25 0200 -- -- 81 -- 95/67 -- 90    04/06/25 0100 -- -- 83 -- 97/70 -- 91    04/06/25 0000 -- -- 88 -- 132/90 -- 93    04/05/25 2300 -- -- 95 -- 126/73 -- 96    04/05/25 2200 -- -- 88 -- 131/91 -- 91    04/05/25 2100 -- -- 83 -- 121/81 -- 93    04/05/25 2012 97.5 (36.4) Oral 81 -- -- -- 93    04/05/25 2000 -- -- 83 -- 91/63 -- 94    04/05/25 1900 -- -- 92 -- 116/73 -- 94    04/05/25 1700 97.6 (36.4) Oral 84 -- 117/73 -- 95    04/05/25 1600 -- -- 83 -- 90/62 -- 94    04/05/25 1200 98.1 (36.7) Oral 90 10 127/88 -- 99    04/05/25 1116  -- -- 93 -- 129/88 -- 94    25 1001 -- -- 99 -- 125/90 -- 97    25 0916 -- -- 93 -- 119/79 -- 95    25 0831 -- -- 91 -- 119/80 -- --    25 0819 -- -- 104 -- -- -- 97    25 0817 -- -- 108 -- 117/86 -- 97    25 0807 98.3 (36.8) Oral 107 18 116/81 -- 96             Physician Progress Notes (last 48 hours)        John Rg MD at 25 1524              ENDOCRINE CONSULT PROGRESS NOTE  DATE OF SERVICE: 25        PATIENT NAME: Xochitl Yao  PATIENT : 1975 AGE: 50 y.o.  MRN NUMBER: [id number]    ==========================================================================    CHIEF COMPLAINT: Hyperthyroidism    CARE TEAM:   Patient Care Team:  Arabella Hernandez APRN as PCP - General (Nurse Practitioner)    SUBJECTIVE    Pt seen and examined.  Labs reviewed.    ==========================================================================    CURRENT ACTIVE HOSPITAL MEDICATIONS    Scheduled Medications:  buprenorphine-naloxone, 1 tablet, Sublingual, Daily  DULoxetine, 60 mg, Oral, Daily  enoxaparin sodium, 40 mg, Subcutaneous, Daily  gabapentin, 800 mg, Oral, Nightly  levETIRAcetam, 1,500 mg, Oral, BID  methIMAzole, 5 mg, Oral, Daily  mirtazapine, 45 mg, Oral, Nightly  mupirocin, 1 Application, Each Nare, BID  nicotine, 1 patch, Transdermal, Q24H  OLANZapine, 20 mg, Oral, Nightly  pantoprazole, 40 mg, Oral, Q AM  potassium chloride, 40 mEq, Oral, Q4H  [START ON 2025] predniSONE, 20 mg, Oral, Daily   Followed by  [START ON 2025] predniSONE, 10 mg, Oral, Daily  sodium chloride, 10 mL, Intravenous, Q12H  sodium chloride, 10 mL, Intravenous, Q12H  sodium chloride, 10 mL, Intravenous, Q12H         PRN Medications:    senna-docusate sodium **AND** polyethylene glycol **AND** bisacodyl **AND** bisacodyl    gabapentin    nitroglycerin    ondansetron    Potassium Replacement - Follow Nurse / BPA Driven Protocol    [COMPLETED] Insert Peripheral IV **AND** sodium chloride     sodium chloride    sodium chloride    sodium chloride    sodium chloride    sodium chloride     ==========================================================================    OBJECTIVE    Vitals:    04/06/25 1500   BP: 126/87   Pulse: 98   Resp:    Temp:    SpO2: 95%      Body mass index is 25.66 kg/m².     General - A&Ox3, NAD, Calm    ==========================================================================    LAB EVALUATION    Lab Results   Component Value Date    GLUCOSE 149 (H) 04/06/2025    BUN 15 04/06/2025    CREATININE 0.95 04/06/2025    BCR 15.8 04/06/2025    K 3.1 (L) 04/06/2025    CO2 24.3 04/06/2025    CALCIUM 8.6 04/06/2025    ALBUMIN 3.7 04/05/2025    AST 63 (H) 04/05/2025    ALT 27 04/05/2025       Lab Results   Component Value Date    HGBA1C 5.20 04/05/2025     Lab Results   Component Value Date    CREATININE 0.95 04/06/2025         Component      Latest Ref Rng 4/5/2025   TSH Baseline      0.270 - 4.200 uIU/mL 0.022 (L)    Free T4      0.93 - 1.70 ng/dL 1.23    T3, Free      2.00 - 4.40 pg/mL 4.32       Legend:  (L) Low    ==========================================================================    ASSESSMENT AND PLAN    # Hyperthyroidism  - Blood work reviewed for TSH, free T4 and free T3 levels  - Continue methimazole 5 mg 1 pill by mouth daily  - Repeat blood work in about 5 weeks time and patient to follow-up with Dr. Bush as outpatient    Thank you for courtesy of consultation, endocrine team will currently sign off, please feel free to reconsult if needed.  Rest as per primary team.    Part of this note may be an electronic transcription/translation of spoken language to printed text using the Dragon Dictation System.     Note: Portions of this note may have been copied from previous notes but documentation have been reviewed and edited as necessary to support clinical decision making for today's visit.  The time of this note does not reflect the time I saw the patient but the time that  "this note was written.    ==========================================================================  John Rg MD  Department of Endocrine, Diabetes and Metabolism  [place], [place]  ==========================================================================      Electronically signed by John Rg MD at 25 1526       Day, ALEYDA Salcedo at 25 1308            Critical Care Progress Note     Xochitl Yao : 1975 MRN:[id number] LOS:1  Rm: 230/     Principal Problem: Hypokalemia     Reason for follow up: All the medical problems listed below    Summary     Xochitl Yao is a 50 y.o. female with PMH of fibromyalgia, peripheral neuropathy, seizure disorder, substance abuse disorder, tobacco abuse disorder-current, hyperlipidemia, chronic low back pain and a fairly new diagnosis of hypothyroidism who presented to the ED today for evaluation of weakness and fatigue.  She reports that she has had progressive worsening of her symptoms for a week and came to the ED because she could no longer stand or walk due to the weakness.  She states that she also has severe bilateral lower extremity cramping which has been present at least since yesterday.  She reports she had an episode of nausea and emesis x 1 yesterday however she states that it occurred due to extreme pain and not due to GI issues.  She denies that she has had any diarrhea, melena, or hematochezia.  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  She has areas of excoriation on her face, back, and abdomen which she reports is due to a \"skin condition\" which is exacerbated by stress and has increased over the past week.  She states that she has been thirsty for the last few days and has been drinking at least 5 bottles of H2O daily.  She denies any routine use of laxatives or weight loss medications.  She was recently diagnosed with hypothyroidism and has been " followed by endocrinology and started on Synthroid 5 days ago.     Evaluation in the emergency department most significantly revealed a potassium of 1.7.  The patient has no arrhythmias or chest pain.  She was admitted to the ICU due to severe hypokalemia     ACP: No ACP documentations on file.  The patient's  is her mother Majo Carney       Patient is on Hospital Day: 2.    Significant Events / Subjective     04/06/25 : The patient reports she feels much better today.  She has more energy and is ambulating around her room.  She is tolerating room air with no shortness of air.  She denies nausea or vomiting.  She has not had any chest pain or shortness of air.    Assessment / Plan     Severe hypokalemia, etiology unclear  Hypophosphatemia  No recent GI losses  Patient denies routine alcohol ingestion  Aggressive electrolyte replacement  Cortisol level 28.20  Hemoglobin A1c 5.20  Urine Osmo 239  Urine potassium 20.3, urine sodium 20  Urine negative for glucose or ketones  Aldosterone level pending  Outpatient medications reviewed     Hypothyroidism  Continue Synthroid  Consulted endocrinology     AST and alk phos elevated  Etiology unclear  Patient denies routine alcohol consumption  Monitor and trend     History of substance abuse  Patient denies IV drug use  Currently being treated with Suboxone    Presumed emphysema secondary to significant tobacco abuse history  Wheezing on initial examination  Continue tapered dose prednisone     Tobacco abuse disorder, current  Nicotine patch ordered    Disposition:  PCU    Code status:   Code Status (Patient has no pulse and is not breathing): CPR (Attempt to Resuscitate)  Medical Interventions (Patient has pulse or is breathing): Full Support       Nutrition:   Diet: Regular/House; Fluid Consistency: Thin (IDDSI 0)   Patient isn't on Tube Feeding     VTE Prophylaxis:  Pharmacologic VTE prophylaxis orders are present.           Objective / Physical Exam      Vital signs:  Temp: 98.2 °F (36.8 °C)  BP: 133/96  Heart Rate: 101  Resp: 11  SpO2: 97 %  Weight: 74.3 kg (163 lb 12.8 oz)    Admission Weight: Weight: 79.4 kg (175 lb)  Current Weight: Weight: 74.3 kg (163 lb 12.8 oz)    Input/Output in last 24 hours:    Intake/Output Summary (Last 24 hours) at 4/6/2025 1308  Last data filed at 4/6/2025 0900  Gross per 24 hour   Intake 2170 ml   Output --   Net 2170 ml      Net IO Since Admission: 2,170 mL [04/06/25 1308]     Physical Exam  Vitals and nursing note reviewed.   Constitutional:       General: She is not in acute distress.     Appearance: Normal appearance.   HENT:      Head: Normocephalic and atraumatic.      Right Ear: External ear normal.      Left Ear: External ear normal.      Mouth/Throat:      Mouth: Mucous membranes are moist.   Eyes:      Conjunctiva/sclera: Conjunctivae normal.      Pupils: Pupils are equal, round, and reactive to light.   Cardiovascular:      Heart sounds: Normal heart sounds, S1 normal and S2 normal. No murmur heard.  Pulmonary:      Breath sounds: Normal breath sounds. No wheezing or rhonchi.   Abdominal:      General: Bowel sounds are normal. There is no distension.      Palpations: Abdomen is soft.      Tenderness: There is no abdominal tenderness.   Musculoskeletal:      Right lower leg: No edema.      Left lower leg: No edema.   Skin:     General: Skin is warm and dry.      Comments: Profuse dried red lesions on the face  Scattered lesions on the upper back and on the abdomen      Neurological:      General: No focal deficit present.      Mental Status: She is alert and oriented to person, place, and time.          Radiology and Labs     Results from last 7 days   Lab Units 04/05/25  2343 04/05/25  0826   WBC 10*3/mm3 7.35 13.90*   HEMOGLOBIN g/dL 13.8 14.5   HEMATOCRIT % 41.7 42.7   PLATELETS 10*3/mm3 216 254           Results from last 7 days   Lab Units 04/05/25  2343 04/05/25  1803 04/05/25  0826   SODIUM mmol/L 142 145 141    POTASSIUM mmol/L 2.1* 1.7* 1.7*   CHLORIDE mmol/L 102 103 99   CO2 mmol/L 27.3 27.9 27.1   ANION GAP mmol/L 12.7 14.1 14.9   BUN mg/dL 15 14 15   CREATININE mg/dL 1.09* 1.12* 1.20*   GLUCOSE mg/dL 130* 103* 104*   PHOSPHORUS mg/dL 3.0 2.8 1.9*   MAGNESIUM mg/dL 2.4 2.3 2.3   ALT (SGPT) U/L 27 26 29   AST (SGOT) U/L 63* 65* 75*   ALK PHOS U/L 159* 154* 169*      Results from last 7 days   Lab Units 04/05/25  2343 04/05/25  1803 04/05/25  0826   ALT (SGPT) U/L 27 26 29   AST (SGOT) U/L 63* 65* 75*   ALK PHOS U/L 159* 154* 169*           XR Spine Lumbar Complete 4+VW  Result Date: 4/5/2025  Impression: 1.No acute fracture or dislocation. 2.Increased stool burden. Electronically Signed: Agapito Manzanares MD  4/5/2025 10:10 AM EDT  Workstation ID: [id number]      Current medications     Scheduled Meds:   buprenorphine-naloxone, 1 tablet, Sublingual, Daily  DULoxetine, 60 mg, Oral, Daily  enoxaparin sodium, 40 mg, Subcutaneous, Daily  gabapentin, 800 mg, Oral, Nightly  levETIRAcetam, 1,500 mg, Oral, BID  methIMAzole, 5 mg, Oral, Daily  mirtazapine, 45 mg, Oral, Nightly  mupirocin, 1 Application, Each Nare, BID  nicotine, 1 patch, Transdermal, Q24H  OLANZapine, 20 mg, Oral, Nightly  pantoprazole, 40 mg, Oral, Q AM  [START ON 4/7/2025] predniSONE, 20 mg, Oral, Daily   Followed by  [START ON 4/9/2025] predniSONE, 10 mg, Oral, Daily  sodium chloride, 10 mL, Intravenous, Q12H        Continuous Infusions:          Plan discussed with RN. Reviewed all other data in the last 24 hours, including but not limited to vitals, labs, microbiology, imaging and pertinent notes from other providers.        ALEYDA Cedeño   Critical Care  04/06/25   13:08 EDT     Electronically signed by Elizabeth Ramires APRN at 04/06/25 1756          Consult Notes (last 48 hours)        Keely Soto APRN at 04/06/25 1625       Attestation signed by Vj aCrd DO at 04/06/25 1840      I have reviewed this documentation and agree.                           [facility] MEDICINE SERVICE  TRANSFER OF CARE/ACCEPTANCE NOTE    PATIENT NAME: Xochitl Yao  : 1975  MRN: [id number]     Active Hospital Problems    Diagnosis  POA    **Hypokalemia [E87.6]  Yes    Acute hypokalemia [E87.6]  Yes      Resolved Hospital Problems   No resolved problems to display.       Interim History:  Patient presented to the emergency department yesterday with weakness and fatigue and was was admitted to the ICU with severe hypokalemia, potassium 1.7 with unclear etiology. Patient also had hypophosphatemia.  Patient underwent aggressive electrolyte replacement.  Patient did not experience any dysrhythmia during electrolyte replacement.  Patient was recently diagnosed with hypothyroidism and started taking Synthroid 5 days prior to admission.    I have noted the following changes since admission: Endocrinology consulted for hyperthyroidism.  Potassium has improved to 3.1 on most recent draw today at 1320.  Patient stable for downgrade from ICU to PCU.    I have reviewed the H&P, diagnostic data and plan of care for Xochitl Yao.  I will be taking over care of this patient during the current hospitalization.        Signature: Electronically signed by ALEYDA Robledo, 25, 16:25 EDT.  [facility] Hospitalist Team    *This is a nonbillable note.*     Electronically signed by Vj Card DO at 25 1850       John Rg MD at 25 1355        Consult Orders    1. Inpatient Endocrinology Consult [[id number]] ordered by Elizabeth Ramires APRN at 25 1211                     ENDOCRINE INITIAL CONSULT NOTE  DATE OF SERVICE: 25  Patient Care Team:  Arabella Hernandez APRN as PCP - General (Nurse Practitioner)        PATIENT NAME: Xochitl Yao  PATIENT : 1975 AGE: 50 y.o.  MRN NUMBER: [id number]  PRIMARY CARE: Arabella Hernandez APRN    ==========================================================================    REASON FOR CONSULT:  Hypothyroidism but patient have subclinical hyperthyroidism    HPI / SUBJECTIVE  50 y.o. female with multiple medical problems including recently diagnosed hyperthyroidism started on MMI therapy following dr. Bush in the clinic admitted for generalized weakness.  Patient seen and examined.  Patient was recently diagnosed with subclinical hyperthyroidism by Dr. Bush and started on methimazole 5 mg 1 pill by mouth daily which she is using for last 5 to 7 days.  Patient on evaluation in the ER had blood work done which showed evidence of low TSH is improved as compared to minimally as compared to 3/19/2025.  Patient had free T4 within normal limits.    ==========================================================================                                                PAST MEDICAL HISTORY    Past Medical History:   Diagnosis Date    Fibromyalgia     Peripheral neuropathy     Seizure     Seizures        ==========================================================================    PAST SURGICAL HISTORY    Past Surgical History:   Procedure Laterality Date    HYSTERECTOMY         ==========================================================================    FAMILY HISTORY    Family History   Problem Relation Age of Onset    Migraines Mother     Dementia Mother        ==========================================================================    SOCIAL HISTORY    Social History     Socioeconomic History    Marital status:    Tobacco Use    Smoking status: Every Day     Current packs/day: 1.50     Average packs/day: 1.5 packs/day for 30.0 years (45.0 ttl pk-yrs)     Types: Cigarettes    Smokeless tobacco: Never   Vaping Use    Vaping status: Never Used   Substance and Sexual Activity    Alcohol use: Not Currently    Drug use: Yes     Types: Marijuana     Comment: once a day    Sexual activity: Defer       ==========================================================================    HOME MEDICATIONS REPORTED ON  ADMISSION      Current Facility-Administered Medications:     sennosides-docusate (PERICOLACE) 8.6-50 MG per tablet 2 tablet, 2 tablet, Oral, BID PRN **AND** polyethylene glycol (MIRALAX) packet 17 g, 17 g, Oral, Daily PRN **AND** bisacodyl (DULCOLAX) EC tablet 5 mg, 5 mg, Oral, Daily PRN **AND** bisacodyl (DULCOLAX) suppository 10 mg, 10 mg, Rectal, Daily PRN, Day, Elizabeth G, APRN    enoxaparin sodium (LOVENOX) syringe 40 mg, 40 mg, Subcutaneous, Daily, Day, Elizabeth G, APRN    mupirocin (BACTROBAN) 2 % nasal ointment 1 Application, 1 Application, Each Nare, BID, Day, Elizabeth G, APRN    nitroglycerin (NITROSTAT) SL tablet 0.4 mg, 0.4 mg, Sublingual, Q5 Min PRN, Day, Elizabeth G, APRN    Potassium Replacement - Follow Nurse / BPA Driven Protocol, , Not Applicable, PRN, Caterina Deal, APRN    [COMPLETED] Insert Peripheral IV, , , Once **AND** sodium chloride 0.9 % flush 10 mL, 10 mL, Intravenous, PRN, Caterina Deal APRN    sodium chloride 0.9 % flush 10 mL, 10 mL, Intravenous, Q12H, Day, Elizabeth G, APRN    sodium chloride 0.9 % flush 10 mL, 10 mL, Intravenous, PRN, Day, Elizabeth G, APRN    sodium chloride 0.9 % infusion 40 mL, 40 mL, Intravenous, PRN, Day, Elizabeth G, APRN    ==========================================================================    ALLERGIES    No Known Allergies    ==========================================================================    ACTIVE HOSPITAL MEDICATIONS    Scheduled Medications:  enoxaparin sodium, 40 mg, Subcutaneous, Daily  mupirocin, 1 Application, Each Nare, BID  sodium chloride, 10 mL, Intravenous, Q12H         PRN Medications:    senna-docusate sodium **AND** polyethylene glycol **AND** bisacodyl **AND** bisacodyl    nitroglycerin    Potassium Replacement - Follow Nurse / BPA Driven Protocol    [COMPLETED] Insert Peripheral IV **AND** sodium chloride    sodium chloride    sodium chloride     ==========================================================================    OBJECTIVE    Vitals:     04/05/25 1200   BP: 127/88   Pulse: 90   Resp: 10   Temp: 98.1 °F (36.7 °C)   SpO2: 99%      Body mass index is 27.35 kg/m².     General: Alert, cooperative, no acute distress    ==========================================================================    LABORATORY DATA    Lab Results   Component Value Date    GLUCOSE 104 (H) 04/05/2025    BUN 15 04/05/2025    CREATININE 1.20 (H) 04/05/2025    BCR 12.5 04/05/2025    K 1.7 (C) 04/05/2025    CO2 27.1 04/05/2025    CALCIUM 9.5 04/05/2025    ALBUMIN 4.1 04/05/2025    AST 75 (H) 04/05/2025    ALT 29 04/05/2025       Lab Results   Component Value Date    HGBA1C 5.20 04/05/2025     Lab Results   Component Value Date    CREATININE 1.20 (H) 04/05/2025         Component      Latest Ref Rng 4/5/2025   TSH Baseline      0.270 - 4.200 uIU/mL 0.022 (L)    Free T4      0.93 - 1.70 ng/dL 1.23       Legend:  (L) Low    Copied blood work from [place] but summary is as follows:    3/19/2025  TSH - 0.01  Free T4 - 1.09  Total T3 - 1.31  TPO -ve  TSI +ve    ==========================================================================    TSH  Order: [id number]   suggestion  Result Information displayed in this report will not trend and may not trigger automated decision support.     Component  Ref Range & Units 2 wk ago   Thyroid Stimulating Hormone  0.35 - 4.94 m[U]/L 0.01 Low    Narrative    Biotin does not interfere with this testing methodology.    Specimen Collected: 03/19/25 12:59    Performed by: [facility] Last Resulted: 03/19/25 23:34   Received From: [facility]  Result Received: 04/05/25 08:03    Received Information  THYROID PEROXIDASE ANTIBODY  Order: [id number]   suggestion  Result Information displayed in this report will not trend and may not trigger automated decision support.     Component  Ref Range & Units 2 wk ago   Thyroid Peroxidase (TPO) Antibody  <5.6 [IU]/mL <3.0     Specimen Collected: 03/19/25 12:59    Performed by: [facility] Last Resulted: 03/19/25 23:41    Received From: [facility]  Result Received: 04/05/25 08:03    Received Information   Contains abnormal data THYROID STIMULATING IMMUNOGLOBULIN  Order: [id number]   suggestion  Result Information displayed in this report will not trend and may not trigger automated decision support.     Component  Ref Range & Units 2 wk ago   Thyroid Stim Immunoglobulin  <=0.54 IU/L 0.61 High    Comment: INTERPRETIVE INFORMATION: Thyroid Stimulating Immunoglobulin                            (TSI)  0.54 IU/L or less.........Consistent with healthy thyroid function  or non-Graves thyroid or autoimmune disease. Those with healthy  thyroid function typically have results less than 0.1 IU/L.    0.55 IU/L or greater......Consistent with Graves disease  (autoimmune hyperthyroidism)    This assay specifically detects thyroid stimulating  autoantibodies. For diagnostic purposes, the results obtained from  this assay should be used in combination with clinical  examination, patient medical history, and other findings.  Performed By: [facility]  [address] [place] [address]  : Mayur Eisenberg MD, PhD  CLIA Number: [id number]     Specimen Collected: 03/19/25 12:59    Performed by: [facility] Last Resulted: 03/21/25 11:05   Received From: [facility]  Result Received: 04/05/25 08:03    Received Information  T4, FREE  Order: [id number]   suggestion  Result Information displayed in this report will not trend and may not trigger automated decision support.     Component  Ref Range & Units 2 wk ago   Free T4  0.70 - 1.48 ng/dL 1.09     Specimen Collected: 03/19/25 12:59    Performed by: [facility] Last Resulted: 03/19/25 23:34   Received From: [facility]  Result Received: 04/05/25 08:03    Received Information  T3  Order: [id number]   suggestion  Result Information displayed in this report will not trend and may not trigger automated decision support.     Component  Ref Range & Units 2 wk ago   T3  Total  0.58 - 1.59 ng/mL 1.31     Specimen Collected: 03/19/25 12:59    Performed by: [facility] Last Resulted: 03/19/25 23:34   Received From: [facility]  Result Received: 04/05/25 08:03     ==========================================================================    ASSESSMENT AND PLAN    # Hyperthyroidism with Low TSH but normal Free T4 - Free T3 levels, elevated TSI levels  - Patient already started on MMI therapy by Dr. Bush  - Agree with continuing MMI 5 mgs PO Daily  - Will need repeat blood work in 4-6 weeks time  - Patient to follow Dr. Bush on d/c  - Free T3 levels ordered    Thank you for courtesy of consultation.  Will follow with you.  Rest as per primary care.    Part of this note may be an electronic transcription/translation of spoken language to printed text using the Dragon Dictation System.     The time of this note does not reflect the time I saw the patient but the time that this note was written.    =======================================  John Rg MD  Department of Endocrine, Diabetes and Metabolism  [place], [place]  =======================================      Electronically signed by John Rg MD at 04/05/25 2732

## 2025-04-11 LAB — ALDOST SERPL-MCNC: <1 NG/DL (ref 0–30)

## 2025-04-14 ENCOUNTER — PATIENT OUTREACH (OUTPATIENT)
Dept: CASE MANAGEMENT | Facility: OTHER | Age: 50
End: 2025-04-14
Payer: MEDICAID

## 2025-04-14 NOTE — OUTREACH NOTE
AMBULATORY CASE MANAGEMENT NOTE    Names and Relationships of Patient/Support Persons: Contact: Xochitl Yao; Relationship: Self -     Patient Outreach    Received referral from  [facility], pt requesting HH. RN-ACM outreach call made to pt, able to reach pt on 2nd attempt. Explained role of RN-ACM and reason for call. Pt reports she is requesting HH SN for help around her house. RN-SALLYM informed pt about HH services vs caregiver services. Pt states has PCP follow up appt scheduled. RN-ACM advised pt she would need an order from her PCP for HH if they feel it is appropriate. Pt states she will follow up with her provider. No other questions or needs per pt, advised her to call with any. Follow up outreach prn.     Oscar HOLT  Ambulatory Case Management    4/14/2025, 11:34 EDT

## 2025-05-05 DIAGNOSIS — G40.319 GENERALIZED IDIOPATHIC EPILEPSY AND EPILEPTIC SYNDROMES, INTRACTABLE, WITHOUT STATUS EPILEPTICUS: ICD-10-CM

## 2025-05-05 DIAGNOSIS — G43.119 INTRACTABLE MIGRAINE WITH AURA WITHOUT STATUS MIGRAINOSUS: ICD-10-CM

## 2025-05-05 RX ORDER — SUMATRIPTAN 50 MG/1
TABLET, FILM COATED ORAL
Qty: 16 TABLET | Refills: 2 | Status: SHIPPED | OUTPATIENT
Start: 2025-05-05

## 2025-05-05 NOTE — TELEPHONE ENCOUNTER
Caller: Xochitl Yao    Relationship: Self    Best call back number: [phone]    Requested Prescriptions:   Requested Prescriptions     Pending Prescriptions Disp Refills    gabapentin (NEURONTIN) 800 MG tablet 120 tablet 0     Sig: Take 1 tablet by mouth Every 6 (Six) Hours.        Pharmacy where request should be sent: [facility] [id number] - [facility] [place] - [facility] [address] AT [place] [address] - [phone] [facility] [phone] FX     Last office visit with prescribing clinician: 12/18/2023   Last telemedicine visit with prescribing clinician: Visit date not found   Next office visit with prescribing clinician: 5/13/2025     Additional details provided by patient: TOOK LAST DOSE THIS AM. PT IS NOW OUT    Does the patient have less than a 3 day supply:  [x] Yes  [] No    Would you like a call back once the refill request has been completed: [] Yes [x] No    If the office needs to give you a call back, can they leave a voicemail: [x] Yes [] No    Brain Pathak Rep   05/05/25 09:28 EDT

## 2025-05-06 RX ORDER — GABAPENTIN 800 MG/1
800 TABLET ORAL EVERY 6 HOURS
Qty: 120 TABLET | Refills: 1 | Status: SHIPPED | OUTPATIENT
Start: 2025-05-06

## 2025-05-21 NOTE — PROGRESS NOTES
Subjective: Chronic migraine and sleep issues    Patient ID: Xochitl Yao is a 50 y.o. female.    History of Present Illness Ms. Yao is a 50-year-old  female who has been followed by Dr. Seiple for #1 chronic migraine  #2 sleep.  Today she is following up on chronic migraine and sleep.  She was last seen on 6/6/2020.  Migraine: The patient states that Imitrex does not control her migraine even if she takes a second dose 2 hours later.  It will still continue up to 6 to 8 hours.  She reports an increase in the number of migraines per month up to 10.     Sleep   She States that she is still having difficulty staying asleep, snoring, and has daytime sleepiness.  She was unable to keep her initial sleep test due to transportation issues.  She is interested in a home sleep study.      Follow up on migraine  Medication: imitrex  Frequency-10 A MONTH-Imitrex does not stop migraine  Duration-6 to 8 hours despite taking Imitrex  Change in migraine: Increase in frequency, same location    Sleep  The patient states she snores, she has sleep maintenance insomnia will awaken several times through the night and has hypersomnia in the morning.  She was in agreement with having her home sleep test reordered.      The following portions of the patient's history were reviewed and updated as appropriate: allergies, current medications, past family history, past medical history, past social history, past surgical history and problem list.    Family History   Problem Relation Age of Onset    Migraines Mother     Dementia Mother        Past Medical History:   Diagnosis Date    Fibromyalgia     Peripheral neuropathy     Seizure     Seizures        Social History     Socioeconomic History    Marital status:    Tobacco Use    Smoking status: Every Day     Current packs/day: 1.50     Average packs/day: 1.5 packs/day for 30.0 years (45.0 ttl pk-yrs)     Types: Cigarettes    Smokeless tobacco: Never   Vaping Use    Vaping  status: Never Used   Substance and Sexual Activity    Alcohol use: Not Currently    Drug use: Yes     Types: Marijuana     Comment: once a day    Sexual activity: Defer         Current Outpatient Medications:     acetaminophen (TYLENOL) 325 MG tablet, Take 2 tablets by mouth Every 6 (Six) Hours As Needed., Disp: , Rfl:     albuterol sulfate  (90 Base) MCG/ACT inhaler, INHALE 1-2 PUFFS EVERY 4 TO 6 HOURS AS NEEDED, Disp: , Rfl:     amantadine (SYMMETREL) 100 MG tablet, , Disp: , Rfl:     amitriptyline (ELAVIL) 25 MG tablet, Take 1 tablet by mouth Daily., Disp: , Rfl:     atomoxetine (STRATTERA) 100 MG capsule, Take 1 capsule by mouth Daily., Disp: , Rfl:     atorvastatin (LIPITOR) 20 MG tablet, Take 1 tablet by mouth every night at bedtime., Disp: , Rfl:     Azelastine HCl 137 MCG/SPRAY solution, , Disp: , Rfl:     buprenorphine-naloxone (SUBOXONE) 8-2 MG per SL tablet, Place 1 tablet under the tongue Daily., Disp: , Rfl:     cephalexin (KEFLEX) 500 MG capsule, TAKE 1 CAPSULE BY MOUTH 4 TIMES A DAY, Disp: , Rfl:     citalopram (CeleXA) 10 MG tablet, Take 1 tablet by mouth Daily., Disp: , Rfl:     clindamycin (CLEOCIN) 150 MG capsule, Take 1 capsule by mouth 4 (Four) Times a Day., Disp: , Rfl:     clindamycin (CLEOCIN) 300 MG capsule, Take 1 tablet by mouth 3 (Three) Times a Day., Disp: , Rfl:     diazePAM (VALIUM) 10 MG tablet, TAKE 1 TABLET BY MOUTH 1 HOUR PRIOR TO SURGERY WITH A SMALL SIP OF WATER, Disp: , Rfl:     DULoxetine (CYMBALTA) 60 MG capsule, Take 1 capsule by mouth Daily., Disp: , Rfl:     estradiol (CLIMARA) 0.025 MG/24HR patch, Place 1 patch on the skin as directed by provider 1 (One) Time Per Week., Disp: , Rfl:     eszopiclone (LUNESTA) 2 MG tablet, Take 1 tablet by mouth At Night As Needed., Disp: , Rfl:     fexofenadine (ALLEGRA) 180 MG tablet, Take 1 tablet by mouth Daily., Disp: , Rfl:     gabapentin (NEURONTIN) 800 MG tablet, Take 1 tablet by mouth Every 6 (Six) Hours., Disp: 120 tablet,  Rfl: 1    guanFACINE HCl ER (INTUNIV) 1 MG tablet sustained-release 24 hour tablet, Take 1 tablet by mouth Every Morning., Disp: , Rfl:     HYDROcodone-acetaminophen (NORCO) 5-325 MG per tablet, Take 1 tablet by mouth Daily., Disp: , Rfl:     hydrOXYzine (ATARAX) 10 MG tablet, Take 1 tablet by mouth Every 8 (Eight) Hours., Disp: , Rfl:     hydrOXYzine (ATARAX) 25 MG tablet, TAKE ONE TABLET BY MOUTH EVERY 6 TO 8 HOURS AS NEEDED FOR ITCHING, Disp: , Rfl:     hydrOXYzine (ATARAX) 50 MG tablet, Take 1 tablet by mouth 4 (Four) Times a Day., Disp: , Rfl:     ibuprofen (ADVIL,MOTRIN) 800 MG tablet, TAKE ONE TABLET EVERY 6-8 HOURS AS NEEDED, Disp: , Rfl:     levETIRAcetam (KEPPRA) 750 MG tablet, TAKE TWO TABLETS BY MOUTH EVERY TWELVE HOURS, Disp: 120 tablet, Rfl: 2    LORazepam (ATIVAN) 1 MG tablet, TAKE ONE HOUR BEFORE PROCEDURE, Disp: , Rfl:     meloxicam (MOBIC) 15 MG tablet, Take 1 tablet by mouth Daily., Disp: , Rfl:     methIMAzole (TAPAZOLE) 5 MG tablet, Take 1 tablet by mouth Daily., Disp: 30 tablet, Rfl: 0    methocarbamol (ROBAXIN) 500 MG tablet, Take 1 tablet by mouth 4 (Four) Times a Day., Disp: , Rfl:     mirtazapine (REMERON) 45 MG tablet, Take 1 tablet by mouth every night at bedtime., Disp: , Rfl:     mupirocin (BACTROBAN) 2 % ointment, , Disp: , Rfl:     neomycin-colistin-hydrocortisone-thonzonium (Cortisporin-TC) 3.3-3-10-0.5 MG/ML otic suspension, Apply 2 drop into affected ear twice a day, Disp: , Rfl:     OLANZapine (zyPREXA) 20 MG tablet, olanzapine 20 mg tablet  TAKE ONE TABLET BY MOUTH AT BEDTIME, Disp: , Rfl:     omeprazole (priLOSEC) 40 MG capsule, Take 1 capsule by mouth Daily., Disp: , Rfl:     ondansetron (ZOFRAN) 8 MG tablet, TAKE ONE TABLET BY MOUTH EVERY DAY AS NEEDED FOR nausea, Disp: , Rfl:     ondansetron ODT (ZOFRAN-ODT) 4 MG disintegrating tablet, Place 1 tablet on the tongue Every 8 (Eight) Hours As Needed for Nausea or Vomiting., Disp: 20 tablet, Rfl: 0    OXcarbazepine (TRILEPTAL) 150  MG tablet, Take 1 tablet by mouth 2 (Two) Times a Day., Disp: , Rfl:     OXcarbazepine (TRILEPTAL) 300 MG tablet, Take 1 tablet by mouth 2 (Two) Times a Day., Disp: , Rfl:     potassium chloride (KLOR-CON M20) 20 MEQ CR tablet, Take 2 tablets by mouth Daily., Disp: 14 tablet, Rfl: 0    potassium chloride 10 MEQ CR tablet, Take 1 tablet by mouth Daily., Disp: , Rfl:     prazosin (MINIPRESS) 2 MG capsule, Take 2 capsules by mouth 2 (Two) Times a Day., Disp: , Rfl:     promethazine (PHENERGAN) 12.5 MG tablet, TAKE ONE TABLET BY MOUTH THREE TIMES DAILY ALTERNATING WITH ZOFRAN AS NEEDED, Disp: , Rfl:     Quviviq 50 MG tablet, , Disp: , Rfl:     rosuvastatin (CRESTOR) 40 MG tablet, Take 1 tablet by mouth every night at bedtime., Disp: , Rfl:     sertraline (ZOLOFT) 50 MG tablet, Take 1 tablet by mouth Daily., Disp: , Rfl:     sulfamethoxazole-trimethoprim (BACTRIM DS,SEPTRA DS) 800-160 MG per tablet, TAKE 1 TABLET BY MOUTH 2 TIMES A DAY FOR 10 DAYS, Disp: , Rfl:     Vyvanse 60 MG capsule, Take 1 capsule by mouth Daily, Disp: , Rfl:     rimegepant sulfate ODT (NURTEC-ODT) 75 MG disintegrating tablet, Max dose 1 tab in 48 hours, Disp: 9 tablet, Rfl: 6    Review of Systems   All other systems reviewed and are negative.         I have reviewed ROS completed by medical assistant.     Objective:      Neurological Exam  Mental Status  Awake and alert. Oriented only to person, place, time and situation. Speech is normal. Language is fluent with no aphasia. Attention and concentration are normal. Fund of knowledge is appropriate for level of education.    Cranial Nerves  CN II: Right visual acuity: Normal. Left visual acuity: Normal. Right normal visual field. Left normal visual field. Right funduscopic exam: not visualized. Left funduscopic exam: not visualized.  CN III, IV, VI: Extraocular movements intact bilaterally. No nystagmus. Normal saccades. Normal smooth pursuit.   Right pupil: 2 mm.   Left pupil: 2 mm.  CN V:  Right:  Facial sensation is normal.  Left: Facial sensation is normal on the left.  CN VII:  Right: There is no facial weakness.  Left: There is no facial weakness.    Gait  Casual gait is normal including stance, stride, and arm swing.         Assessment/Plan:  The patient was given samples of Nurtec Lot #[id number] expiration April 2028 x 2 boxes.  I also wrote a prescription for Nurtec number 9 pills that was sent to her pharmacy.  She was given a discount card as well.  She is to contact the clinic if this does not stop her migraine.    I will reorder the patient's sleep test and she will be seen back in follow-up 30 to 90 days after receiving equipment if prescribed.      Diagnoses and all orders for this visit:    1. Daytime somnolence (Primary)  -     Home Sleep Study; Future    2. Migraine without aura and without status migrainosus, not intractable  -     rimegepant sulfate ODT (NURTEC-ODT) 75 MG disintegrating tablet; Max dose 1 tab in 48 hours  Dispense: 9 tablet; Refill: 6          Return in about 6 months (around 11/22/2025) for Xochitl Huynh.     I spent 21 minutes caring for Xochitl on this date of service. This time includes time spent by me in the following activities: preparing for the visit, reviewing tests, obtaining and/or reviewing a separately obtained history, performing a medically appropriate examination and/or evaluation, counseling and educating the patient/family/caregiver, ordering medications, tests, or procedures, and documenting information in the medical record.      This document has been electronically signed by ANNABELLE Pruitt on May 22, 2025 09:32 EDT

## 2025-05-22 ENCOUNTER — OFFICE VISIT (OUTPATIENT)
Dept: NEUROLOGY | Facility: CLINIC | Age: 50
End: 2025-05-22
Payer: MEDICAID

## 2025-05-22 VITALS
HEIGHT: 67 IN | WEIGHT: 172 LBS | DIASTOLIC BLOOD PRESSURE: 87 MMHG | BODY MASS INDEX: 27 KG/M2 | SYSTOLIC BLOOD PRESSURE: 129 MMHG | HEART RATE: 69 BPM

## 2025-05-22 DIAGNOSIS — G43.009 MIGRAINE WITHOUT AURA AND WITHOUT STATUS MIGRAINOSUS, NOT INTRACTABLE: ICD-10-CM

## 2025-05-22 DIAGNOSIS — R40.0 DAYTIME SOMNOLENCE: Primary | ICD-10-CM

## 2025-05-22 PROCEDURE — 99213 OFFICE O/P EST LOW 20 MIN: CPT | Performed by: NURSE PRACTITIONER

## 2025-05-22 PROCEDURE — 1159F MED LIST DOCD IN RCRD: CPT | Performed by: NURSE PRACTITIONER

## 2025-05-22 PROCEDURE — 1160F RVW MEDS BY RX/DR IN RCRD: CPT | Performed by: NURSE PRACTITIONER

## 2025-05-22 RX ORDER — CLINDAMYCIN HYDROCHLORIDE 300 MG/1
1 CAPSULE ORAL 3 TIMES DAILY
COMMUNITY

## 2025-05-22 RX ORDER — MELOXICAM 15 MG/1
1 TABLET ORAL DAILY
COMMUNITY

## 2025-05-22 RX ORDER — ATOMOXETINE 100 MG/1
1 CAPSULE ORAL DAILY
COMMUNITY

## 2025-05-22 RX ORDER — DIAZEPAM 10 MG/1
TABLET ORAL
COMMUNITY

## 2025-05-22 RX ORDER — IBUPROFEN 800 MG/1
TABLET, FILM COATED ORAL
COMMUNITY

## 2025-05-22 RX ORDER — DARIDOREXANT 50 MG/1
TABLET, FILM COATED ORAL
COMMUNITY
Start: 2025-04-15

## 2025-05-22 RX ORDER — ATORVASTATIN CALCIUM 20 MG/1
1 TABLET, FILM COATED ORAL
COMMUNITY

## 2025-05-22 RX ORDER — POTASSIUM CHLORIDE 750 MG/1
10 TABLET, EXTENDED RELEASE ORAL DAILY
COMMUNITY
Start: 2025-02-22

## 2025-05-22 RX ORDER — METHOCARBAMOL 500 MG/1
1 TABLET, FILM COATED ORAL 4 TIMES DAILY
COMMUNITY
Start: 2025-03-03

## 2025-05-22 RX ORDER — MUPIROCIN 20 MG/G
OINTMENT TOPICAL
COMMUNITY
Start: 2025-04-17

## 2025-05-22 RX ORDER — GUANFACINE 1 MG/1
1 TABLET, EXTENDED RELEASE ORAL EVERY MORNING
COMMUNITY

## 2025-05-22 RX ORDER — CITALOPRAM HYDROBROMIDE 10 MG/1
1 TABLET ORAL DAILY
COMMUNITY

## 2025-05-22 RX ORDER — CEPHALEXIN 500 MG/1
CAPSULE ORAL
COMMUNITY

## 2025-05-22 RX ORDER — HYDROXYZINE HYDROCHLORIDE 50 MG/1
1 TABLET, FILM COATED ORAL 4 TIMES DAILY
COMMUNITY

## 2025-05-22 RX ORDER — AMANTADINE HYDROCHLORIDE 100 MG/1
TABLET ORAL
COMMUNITY
Start: 2025-05-02

## 2025-05-22 RX ORDER — ESZOPICLONE 2 MG/1
1 TABLET, FILM COATED ORAL NIGHTLY PRN
COMMUNITY

## 2025-05-22 RX ORDER — ONDANSETRON 8 MG/1
TABLET, FILM COATED ORAL
COMMUNITY

## 2025-05-22 RX ORDER — COLISTIN SULFATE, NEOMYCIN SULFATE, THONZONIUM BROMIDE AND HYDROCORTISONE ACETATE 3; 3.3; .5; 1 MG/ML; MG/ML; MG/ML; MG/ML
SUSPENSION AURICULAR (OTIC)
COMMUNITY

## 2025-05-22 RX ORDER — SULFAMETHOXAZOLE AND TRIMETHOPRIM 800; 160 MG/1; MG/1
TABLET ORAL
COMMUNITY

## 2025-05-22 RX ORDER — CLINDAMYCIN HYDROCHLORIDE 150 MG/1
1 CAPSULE ORAL 4 TIMES DAILY
COMMUNITY

## 2025-05-22 RX ORDER — OXCARBAZEPINE 150 MG/1
1 TABLET, FILM COATED ORAL 2 TIMES DAILY
COMMUNITY

## 2025-05-22 RX ORDER — HYDROXYZINE HYDROCHLORIDE 25 MG/1
TABLET, FILM COATED ORAL
COMMUNITY

## 2025-05-22 RX ORDER — ACETAMINOPHEN 325 MG/1
2 TABLET ORAL EVERY 6 HOURS PRN
COMMUNITY
Start: 2025-02-22

## 2025-05-22 RX ORDER — AZELASTINE HYDROCHLORIDE 137 UG/1
SPRAY, METERED NASAL
COMMUNITY
Start: 2025-04-16

## 2025-05-22 RX ORDER — HYDROCODONE BITARTRATE AND ACETAMINOPHEN 5; 325 MG/1; MG/1
1 TABLET ORAL DAILY
COMMUNITY

## 2025-05-22 RX ORDER — FEXOFENADINE HCL 180 MG/1
1 TABLET ORAL DAILY
COMMUNITY

## 2025-05-22 RX ORDER — OXCARBAZEPINE 300 MG/1
1 TABLET, FILM COATED ORAL 2 TIMES DAILY
COMMUNITY

## 2025-05-22 RX ORDER — PRAZOSIN HYDROCHLORIDE 2 MG/1
2 CAPSULE ORAL 2 TIMES DAILY
COMMUNITY

## 2025-05-22 RX ORDER — LORAZEPAM 1 MG/1
TABLET ORAL
COMMUNITY

## 2025-05-22 RX ORDER — HYDROXYZINE HYDROCHLORIDE 10 MG/1
1 TABLET, FILM COATED ORAL EVERY 8 HOURS
COMMUNITY

## 2025-06-17 DIAGNOSIS — G43.119 INTRACTABLE MIGRAINE WITH AURA WITHOUT STATUS MIGRAINOSUS: ICD-10-CM

## 2025-06-17 RX ORDER — LEVETIRACETAM 750 MG/1
1500 TABLET ORAL EVERY 12 HOURS SCHEDULED
Qty: 120 TABLET | Refills: 2 | Status: SHIPPED | OUTPATIENT
Start: 2025-06-17

## 2025-06-25 ENCOUNTER — HOSPITAL ENCOUNTER (OUTPATIENT)
Dept: SLEEP MEDICINE | Facility: HOSPITAL | Age: 50
Discharge: HOME | End: 2025-06-25
Admitting: NURSE PRACTITIONER
Payer: COMMERCIAL

## 2025-06-25 DIAGNOSIS — R40.0 DAYTIME SOMNOLENCE: ICD-10-CM

## 2025-06-25 PROCEDURE — G0399 HOME SLEEP TEST/TYPE 3 PORTA: HCPCS

## 2025-06-30 ENCOUNTER — TELEPHONE (OUTPATIENT)
Dept: NEUROLOGY | Facility: CLINIC | Age: 50
End: 2025-06-30
Payer: COMMERCIAL

## 2025-06-30 ENCOUNTER — HOSPITAL ENCOUNTER (EMERGENCY)
Facility: HOSPITAL | Age: 50
Discharge: LEFT BEFORE DISCHARGE | End: 2025-06-30
Attending: EMERGENCY MEDICINE | Admitting: EMERGENCY MEDICINE
Payer: COMMERCIAL

## 2025-06-30 ENCOUNTER — APPOINTMENT (OUTPATIENT)
Dept: CT IMAGING | Facility: HOSPITAL | Age: 50
End: 2025-06-30
Payer: COMMERCIAL

## 2025-06-30 ENCOUNTER — APPOINTMENT (OUTPATIENT)
Dept: GENERAL RADIOLOGY | Facility: HOSPITAL | Age: 50
End: 2025-06-30
Payer: COMMERCIAL

## 2025-06-30 VITALS
TEMPERATURE: 97.9 F | DIASTOLIC BLOOD PRESSURE: 72 MMHG | OXYGEN SATURATION: 98 % | HEART RATE: 68 BPM | BODY MASS INDEX: 26.09 KG/M2 | SYSTOLIC BLOOD PRESSURE: 106 MMHG | WEIGHT: 166.23 LBS | HEIGHT: 67 IN | RESPIRATION RATE: 16 BRPM

## 2025-06-30 DIAGNOSIS — E87.6 HYPOKALEMIA: ICD-10-CM

## 2025-06-30 DIAGNOSIS — R53.1 WEAKNESS: Primary | ICD-10-CM

## 2025-06-30 LAB
ALBUMIN SERPL-MCNC: 4.3 G/DL (ref 3.5–5.2)
ALBUMIN/GLOB SERPL: 1.3 G/DL
ALP SERPL-CCNC: 185 U/L (ref 39–117)
ALT SERPL W P-5'-P-CCNC: 11 U/L (ref 1–33)
ANION GAP SERPL CALCULATED.3IONS-SCNC: 14.1 MMOL/L (ref 5–15)
AST SERPL-CCNC: 35 U/L (ref 1–32)
BASOPHILS # BLD AUTO: 0.07 10*3/MM3 (ref 0–0.2)
BASOPHILS NFR BLD AUTO: 0.5 % (ref 0–1.5)
BILIRUB SERPL-MCNC: 0.3 MG/DL (ref 0–1.2)
BUN SERPL-MCNC: 16.1 MG/DL (ref 6–20)
BUN/CREAT SERPL: 13 (ref 7–25)
CALCIUM SPEC-SCNC: 9.6 MG/DL (ref 8.6–10.5)
CHLORIDE SERPL-SCNC: 101 MMOL/L (ref 98–107)
CO2 SERPL-SCNC: 26.9 MMOL/L (ref 22–29)
CREAT SERPL-MCNC: 1.24 MG/DL (ref 0.57–1)
DEPRECATED RDW RBC AUTO: 43.4 FL (ref 37–54)
EGFRCR SERPLBLD CKD-EPI 2021: 52.5 ML/MIN/1.73
EOSINOPHIL # BLD AUTO: 0.29 10*3/MM3 (ref 0–0.4)
EOSINOPHIL NFR BLD AUTO: 2.1 % (ref 0.3–6.2)
ERYTHROCYTE [DISTWIDTH] IN BLOOD BY AUTOMATED COUNT: 13.5 % (ref 12.3–15.4)
GLOBULIN UR ELPH-MCNC: 3.4 GM/DL
GLUCOSE BLDC GLUCOMTR-MCNC: 115 MG/DL (ref 70–105)
GLUCOSE SERPL-MCNC: 103 MG/DL (ref 65–99)
HCT VFR BLD AUTO: 44 % (ref 34–46.6)
HGB BLD-MCNC: 14 G/DL (ref 12–15.9)
HOLD SPECIMEN: NORMAL
IMM GRANULOCYTES # BLD AUTO: 0.05 10*3/MM3 (ref 0–0.05)
IMM GRANULOCYTES NFR BLD AUTO: 0.4 % (ref 0–0.5)
LYMPHOCYTES # BLD AUTO: 2.8 10*3/MM3 (ref 0.7–3.1)
LYMPHOCYTES NFR BLD AUTO: 20.2 % (ref 19.6–45.3)
MAGNESIUM SERPL-MCNC: 2.5 MG/DL (ref 1.6–2.6)
MCH RBC QN AUTO: 27.9 PG (ref 26.6–33)
MCHC RBC AUTO-ENTMCNC: 31.8 G/DL (ref 31.5–35.7)
MCV RBC AUTO: 87.8 FL (ref 79–97)
MONOCYTES # BLD AUTO: 1.11 10*3/MM3 (ref 0.1–0.9)
MONOCYTES NFR BLD AUTO: 8 % (ref 5–12)
NEUTROPHILS NFR BLD AUTO: 68.8 % (ref 42.7–76)
NEUTROPHILS NFR BLD AUTO: 9.55 10*3/MM3 (ref 1.7–7)
NRBC BLD AUTO-RTO: 0 /100 WBC (ref 0–0.2)
PLATELET # BLD AUTO: 170 10*3/MM3 (ref 140–450)
PMV BLD AUTO: 12.9 FL (ref 6–12)
POTASSIUM SERPL-SCNC: 2.2 MMOL/L (ref 3.5–5.2)
PROT SERPL-MCNC: 7.7 G/DL (ref 6–8.5)
RBC # BLD AUTO: 5.01 10*6/MM3 (ref 3.77–5.28)
SODIUM SERPL-SCNC: 142 MMOL/L (ref 136–145)
WBC NRBC COR # BLD AUTO: 13.87 10*3/MM3 (ref 3.4–10.8)
WHOLE BLOOD HOLD SPECIMEN: NORMAL

## 2025-06-30 PROCEDURE — 83735 ASSAY OF MAGNESIUM: CPT | Performed by: EMERGENCY MEDICINE

## 2025-06-30 PROCEDURE — 70450 CT HEAD/BRAIN W/O DYE: CPT

## 2025-06-30 PROCEDURE — 80053 COMPREHEN METABOLIC PANEL: CPT | Performed by: EMERGENCY MEDICINE

## 2025-06-30 PROCEDURE — 99285 EMERGENCY DEPT VISIT HI MDM: CPT

## 2025-06-30 PROCEDURE — 71045 X-RAY EXAM CHEST 1 VIEW: CPT

## 2025-06-30 PROCEDURE — 85025 COMPLETE CBC W/AUTO DIFF WBC: CPT | Performed by: EMERGENCY MEDICINE

## 2025-06-30 PROCEDURE — 82948 REAGENT STRIP/BLOOD GLUCOSE: CPT

## 2025-06-30 PROCEDURE — 99284 EMERGENCY DEPT VISIT MOD MDM: CPT

## 2025-06-30 PROCEDURE — 93005 ELECTROCARDIOGRAM TRACING: CPT | Performed by: EMERGENCY MEDICINE

## 2025-06-30 PROCEDURE — 36415 COLL VENOUS BLD VENIPUNCTURE: CPT

## 2025-06-30 RX ORDER — POTASSIUM CHLORIDE 1500 MG/1
40 TABLET, EXTENDED RELEASE ORAL
Status: DISCONTINUED | OUTPATIENT
Start: 2025-06-30 | End: 2025-06-30 | Stop reason: HOSPADM

## 2025-06-30 RX ORDER — POTASSIUM CHLORIDE 7.45 MG/ML
10 INJECTION INTRAVENOUS
Status: DISCONTINUED | OUTPATIENT
Start: 2025-06-30 | End: 2025-06-30 | Stop reason: HOSPADM

## 2025-06-30 RX ORDER — SODIUM CHLORIDE 0.9 % (FLUSH) 0.9 %
10 SYRINGE (ML) INJECTION AS NEEDED
Status: DISCONTINUED | OUTPATIENT
Start: 2025-06-30 | End: 2025-06-30 | Stop reason: HOSPADM

## 2025-06-30 RX ADMIN — POTASSIUM CHLORIDE 40 MEQ: 1500 TABLET, EXTENDED RELEASE ORAL at 12:08

## 2025-06-30 NOTE — ED NOTES
RN attempted IV unsuccessful, 2nd RN attempted and unsuccessful. Pt refused any more IV attempts.  notified

## 2025-06-30 NOTE — ED PROVIDER NOTES
Subjective   History of Present Illness  50-year-old female presents stating she thinks her potassium is low.  States she is felt some tingling, muscle cramps and some right arm weakness since early yesterday.  She reports no headache or blurry vision or speech difficulties.  States she has had similar symptoms in the past with low potassium but states that she is not had lateralizing symptoms in the past.  She reports right arm weakness was worsened this morning when she woke up  Review of Systems    Past Medical History:   Diagnosis Date    Fibromyalgia     Peripheral neuropathy     Seizure     Seizures        No Known Allergies    Past Surgical History:   Procedure Laterality Date    HYSTERECTOMY         Family History   Problem Relation Age of Onset    Migraines Mother     Dementia Mother        Social History     Socioeconomic History    Marital status:    Tobacco Use    Smoking status: Every Day     Current packs/day: 1.50     Average packs/day: 1.5 packs/day for 30.0 years (45.0 ttl pk-yrs)     Types: Cigarettes    Smokeless tobacco: Never   Vaping Use    Vaping status: Never Used   Substance and Sexual Activity    Alcohol use: Not Currently    Drug use: Yes     Types: Marijuana     Comment: once a day    Sexual activity: Defer       Prior to Admission medications    Medication Sig Start Date End Date Taking? Authorizing Provider   acetaminophen (TYLENOL) 325 MG tablet Take 2 tablets by mouth Every 6 (Six) Hours As Needed. 2/22/25   Hao Ortez MD   albuterol sulfate  (90 Base) MCG/ACT inhaler INHALE 1-2 PUFFS EVERY 4 TO 6 HOURS AS NEEDED    Hao Ortez MD   amantadine (SYMMETREL) 100 MG tablet  5/2/25   Hao Ortez MD   amitriptyline (ELAVIL) 25 MG tablet Take 1 tablet by mouth Daily.    Hao Ortez MD   atomoxetine (STRATTERA) 100 MG capsule Take 1 capsule by mouth Daily.    Hao Ortez MD   atorvastatin (LIPITOR) 20 MG tablet Take 1 tablet by  mouth every night at bedtime.    Hao Ortez MD   Azelastine HCl 137 MCG/SPRAY solution  4/16/25   Hao Ortez MD   buprenorphine-naloxone (SUBOXONE) 8-2 MG per SL tablet Place 1 tablet under the tongue Daily.    Hao Ortez MD   cephalexin (KEFLEX) 500 MG capsule TAKE 1 CAPSULE BY MOUTH 4 TIMES A DAY    Hao Ortez MD   citalopram (CeleXA) 10 MG tablet Take 1 tablet by mouth Daily.    Hao Ortez MD   clindamycin (CLEOCIN) 150 MG capsule Take 1 capsule by mouth 4 (Four) Times a Day.    Hao Ortez MD   clindamycin (CLEOCIN) 300 MG capsule Take 1 tablet by mouth 3 (Three) Times a Day.    Hao Ortez MD   diazePAM (VALIUM) 10 MG tablet TAKE 1 TABLET BY MOUTH 1 HOUR PRIOR TO SURGERY WITH A SMALL SIP OF WATER    Hao Ortez MD   DULoxetine (CYMBALTA) 60 MG capsule Take 1 capsule by mouth Daily.    Hao Ortez MD   estradiol (CLIMARA) 0.025 MG/24HR patch Place 1 patch on the skin as directed by provider 1 (One) Time Per Week.    Hao Ortez MD   eszopiclone (LUNESTA) 2 MG tablet Take 1 tablet by mouth At Night As Needed.    Hao Ortez MD   fexofenadine (ALLEGRA) 180 MG tablet Take 1 tablet by mouth Daily.    Hao Ortez MD   gabapentin (NEURONTIN) 800 MG tablet Take 1 tablet by mouth Every 6 (Six) Hours. 5/6/25   Seipel, Joseph F, MD   guanFACINE HCl ER (INTUNIV) 1 MG tablet sustained-release 24 hour tablet Take 1 tablet by mouth Every Morning.    Hao Ortez MD   HYDROcodone-acetaminophen (NORCO) 5-325 MG per tablet Take 1 tablet by mouth Daily.    Hao Ortez MD   hydrOXYzine (ATARAX) 10 MG tablet Take 1 tablet by mouth Every 8 (Eight) Hours.    Hao Ortez MD   hydrOXYzine (ATARAX) 25 MG tablet TAKE ONE TABLET BY MOUTH EVERY 6 TO 8 HOURS AS NEEDED FOR ITCHING    Hao Ortez MD   hydrOXYzine (ATARAX) 50 MG tablet Take 1 tablet by mouth 4 (Four) Times a Day.     Hao Ortez MD   ibuprofen (ADVIL,MOTRIN) 800 MG tablet TAKE ONE TABLET EVERY 6-8 HOURS AS NEEDED    Hao Ortez MD   levETIRAcetam (KEPPRA) 750 MG tablet TAKE 2 TABLETS BY MOUTH EVERY 12 HOURS 6/17/25   Seipel, Joseph F, MD   LORazepam (ATIVAN) 1 MG tablet TAKE ONE HOUR BEFORE PROCEDURE    Hao Ortez MD   meloxicam (MOBIC) 15 MG tablet Take 1 tablet by mouth Daily.    Hao Ortez MD   methIMAzole (TAPAZOLE) 5 MG tablet Take 1 tablet by mouth Daily. 4/7/25   Blane Mace DO   methocarbamol (ROBAXIN) 500 MG tablet Take 1 tablet by mouth 4 (Four) Times a Day. 3/3/25   Hao Ortez MD   mirtazapine (REMERON) 45 MG tablet Take 1 tablet by mouth every night at bedtime.    Hao Ortez MD   mupirocin (BACTROBAN) 2 % ointment  4/17/25   Hao Ortez MD   neomycin-colistin-hydrocortisone-thonzonium (Cortisporin-TC) 3.3-3-10-0.5 MG/ML otic suspension Apply 2 drop into affected ear twice a day    Hao Ortez MD   OLANZapine (zyPREXA) 20 MG tablet olanzapine 20 mg tablet   TAKE ONE TABLET BY MOUTH AT BEDTIME    Hao Ortez MD   omeprazole (priLOSEC) 40 MG capsule Take 1 capsule by mouth Daily. 11/22/23   Hao Ortez MD   ondansetron (ZOFRAN) 8 MG tablet TAKE ONE TABLET BY MOUTH EVERY DAY AS NEEDED FOR nausea    Hao Ortez MD   ondansetron ODT (ZOFRAN-ODT) 4 MG disintegrating tablet Place 1 tablet on the tongue Every 8 (Eight) Hours As Needed for Nausea or Vomiting. 4/7/25   Blane Mace DO   OXcarbazepine (TRILEPTAL) 150 MG tablet Take 1 tablet by mouth 2 (Two) Times a Day.    Hao Ortez MD   OXcarbazepine (TRILEPTAL) 300 MG tablet Take 1 tablet by mouth 2 (Two) Times a Day.    Hao Ortez MD   potassium chloride (KLOR-CON M20) 20 MEQ CR tablet Take 2 tablets by mouth Daily. 4/7/25   Blane Mace DO   potassium chloride 10 MEQ CR tablet Take 1 tablet by mouth Daily. 2/22/25    "Hao Ortez MD   prazosin (MINIPRESS) 2 MG capsule Take 2 capsules by mouth 2 (Two) Times a Day.    Hao Ortez MD   promethazine (PHENERGAN) 12.5 MG tablet TAKE ONE TABLET BY MOUTH THREE TIMES DAILY ALTERNATING WITH ZOFRAN AS NEEDED 12/11/23   Hao Ortez MD   Quviviq 50 MG tablet  4/15/25   Hao Ortez MD   rimegepant sulfate ODT (NURTEC-ODT) 75 MG disintegrating tablet Max dose 1 tab in 48 hours 5/22/25   Xochitl Huynh APRN   rosuvastatin (CRESTOR) 40 MG tablet Take 1 tablet by mouth every night at bedtime. 12/12/23   Hao Ortez MD   sertraline (ZOLOFT) 50 MG tablet Take 1 tablet by mouth Daily.    Hao Ortez MD   sulfamethoxazole-trimethoprim (BACTRIM DS,SEPTRA DS) 800-160 MG per tablet TAKE 1 TABLET BY MOUTH 2 TIMES A DAY FOR 10 DAYS    Hao Ortez MD   Vyvanse 60 MG capsule Take 1 capsule by mouth Daily 11/17/23   Hao Ortez MD     /72   Pulse 68   Temp 97.9 °F (36.6 °C) (Oral)   Resp 16   Ht 170.2 cm (67\")   Wt 75.4 kg (166 lb 3.6 oz)   LMP  (LMP Unknown)   SpO2 98%   BMI 26.03 kg/m²       Objective   Physical Exam  General: Well-developed well-appearing, no acute distress, alert and appropriate  Eyes: Pupils round and equal, sclera nonicteric  HEENT: Mucous membranes moist, no mucosal swelling  Neck: Supple, no nuchal rigidity,   Respirations: Respirations nonlabored, equal breath sounds bilaterally, clear lungs  Heart regular rate and rhythm, no murmurs rubs or gallops,   Abdomen soft nontender nondistended, no hepatosplenomegaly, no hernia, no mass, normal bowel sounds, no CVA tenderness  Extremities no clubbing cyanosis or edema, calves are symmetric and nontender  Neuro cranial nerves II through XII intact , right pronator drift, normal sensation bilaterally, slight right leg drift, no slurred speech, no facial droop, no ataxia,  no nuchal rigidity, normal mentation  Psych oriented, pleasant " affect  Skin no rash, brisk cap refill  Procedures           ED Course                                Total (NIH Stroke Scale): 5          Results for orders placed or performed during the hospital encounter of 06/30/25   Comprehensive Metabolic Panel    Collection Time: 06/30/25 11:01 AM    Specimen: Blood   Result Value Ref Range    Glucose 103 (H) 65 - 99 mg/dL    BUN 16.1 6.0 - 20.0 mg/dL    Creatinine 1.24 (H) 0.57 - 1.00 mg/dL    Sodium 142 136 - 145 mmol/L    Potassium 2.2 (C) 3.5 - 5.2 mmol/L    Chloride 101 98 - 107 mmol/L    CO2 26.9 22.0 - 29.0 mmol/L    Calcium 9.6 8.6 - 10.5 mg/dL    Total Protein 7.7 6.0 - 8.5 g/dL    Albumin 4.3 3.5 - 5.2 g/dL    ALT (SGPT) 11 1 - 33 U/L    AST (SGOT) 35 (H) 1 - 32 U/L    Alkaline Phosphatase 185 (H) 39 - 117 U/L    Total Bilirubin 0.3 0.0 - 1.2 mg/dL    Globulin 3.4 gm/dL    A/G Ratio 1.3 g/dL    BUN/Creatinine Ratio 13.0 7.0 - 25.0    Anion Gap 14.1 5.0 - 15.0 mmol/L    eGFR 52.5 (L) >60.0 mL/min/1.73   CBC Auto Differential    Collection Time: 06/30/25 11:01 AM    Specimen: Blood   Result Value Ref Range    WBC 13.87 (H) 3.40 - 10.80 10*3/mm3    RBC 5.01 3.77 - 5.28 10*6/mm3    Hemoglobin 14.0 12.0 - 15.9 g/dL    Hematocrit 44.0 34.0 - 46.6 %    MCV 87.8 79.0 - 97.0 fL    MCH 27.9 26.6 - 33.0 pg    MCHC 31.8 31.5 - 35.7 g/dL    RDW 13.5 12.3 - 15.4 %    RDW-SD 43.4 37.0 - 54.0 fl    MPV 12.9 (H) 6.0 - 12.0 fL    Platelets 170 140 - 450 10*3/mm3    Neutrophil % 68.8 42.7 - 76.0 %    Lymphocyte % 20.2 19.6 - 45.3 %    Monocyte % 8.0 5.0 - 12.0 %    Eosinophil % 2.1 0.3 - 6.2 %    Basophil % 0.5 0.0 - 1.5 %    Immature Grans % 0.4 0.0 - 0.5 %    Neutrophils, Absolute 9.55 (H) 1.70 - 7.00 10*3/mm3    Lymphocytes, Absolute 2.80 0.70 - 3.10 10*3/mm3    Monocytes, Absolute 1.11 (H) 0.10 - 0.90 10*3/mm3    Eosinophils, Absolute 0.29 0.00 - 0.40 10*3/mm3    Basophils, Absolute 0.07 0.00 - 0.20 10*3/mm3    Immature Grans, Absolute 0.05 0.00 - 0.05 10*3/mm3    nRBC 0.0 0.0 -  0.2 /100 WBC   Magnesium    Collection Time: 06/30/25 11:01 AM    Specimen: Blood   Result Value Ref Range    Magnesium 2.5 1.6 - 2.6 mg/dL   Green Top (Gel)    Collection Time: 06/30/25 11:01 AM   Result Value Ref Range    Extra Tube Hold for add-ons.    Lavender Top    Collection Time: 06/30/25 11:01 AM   Result Value Ref Range    Extra Tube hold for add-on    ECG 12 Lead ED Triage Standing Order; Stroke (Onset >12 hrs)    Collection Time: 06/30/25 11:18 AM   Result Value Ref Range    QTC Interval  ms   POC Glucose Once    Collection Time: 06/30/25 11:35 AM    Specimen: Blood   Result Value Ref Range    Glucose 115 (H) 70 - 105 mg/dL     CT Head Without Contrast Stroke Protocol  Result Date: 6/30/2025  Impression: No acute intracranial finding. Electronically Signed: Dinah Alicea MD  6/30/2025 11:32 AM EDT  Workstation ID: [id number]    XR Chest 1 View  Result Date: 6/30/2025  Impression: No active disease. Electronically Signed: Basil Plummer MD  6/30/2025 11:30 AM EDT  Workstation ID: [id number]                Medical Decision Making  Patient presents with 24 hours of symptoms with some right arm weakness and to lesser degree some right leg weakness.  She was not a candidate for thrombolytic therapy or acute intravascular intervention due to delay in presentation.    Patient allowed a blood stick for lab analysis but refused further IV attempts.  She was aware that this would significantly impair her treatment and evaluation and signed out AGAINST MEDICAL ADVICE.  She was given some oral potassium.  Notably she had normal mental status and voiced to the nurse awareness of her decision and our intention to admit her for further care and evaluation to further rule out stroke and to treat her low potassium..    Problems Addressed:  Hypokalemia: complicated acute illness or injury  Weakness: complicated acute illness or injury    Amount and/or Complexity of Data Reviewed  Labs: ordered.  Radiology:  ordered.  ECG/medicine tests: ordered.    Risk  OTC drugs.  Prescription drug management.        Final diagnoses:   Weakness   Hypokalemia       ED Disposition  ED Disposition       ED Disposition   AMA    Condition   --    Comment   --               No follow-up provider specified.       Medication List      No changes were made to your prescriptions during this visit.            Gilberto Nagy MD  06/30/25 [id number]

## 2025-06-30 NOTE — TELEPHONE ENCOUNTER
"Caller: Xochitl Yao    Relationship: Self    Best call back number: [phone]    Which medication are you concerned about: GABAPENTIN    What are your concerns: PT SAID THE PHARMACY WAS NOT ABLE TO FILL DUE TO \"SOMETHING BEING WRONG WITH THE RX\". DID NOT HAVE MORE DETAILS. NOT SHOWING WHERE WE HAVE BEEN CONTACTED BY THE PHARMACY YET.    PLEASE REVIEW AND ADVISE  "

## 2025-07-01 DIAGNOSIS — G40.319 GENERALIZED IDIOPATHIC EPILEPSY AND EPILEPTIC SYNDROMES, INTRACTABLE, WITHOUT STATUS EPILEPTICUS: ICD-10-CM

## 2025-07-01 LAB — QTC INTERVAL: NORMAL MS

## 2025-07-01 RX ORDER — GABAPENTIN 800 MG/1
800 TABLET ORAL EVERY 6 HOURS
Qty: 120 TABLET | Refills: 1 | OUTPATIENT
Start: 2025-07-01

## 2025-07-01 NOTE — TELEPHONE ENCOUNTER
Caller: Xochitl Yao    Relationship: Self    Best call back number: [phone]    What was the call regarding: RETURNING CALL TO [facility]     PLEASE REVIEW AND ADVISE

## 2025-07-03 ENCOUNTER — SPECIALTY PHARMACY (OUTPATIENT)
Dept: INFUSION THERAPY | Facility: HOSPITAL | Age: 50
End: 2025-07-03
Payer: COMMERCIAL

## 2025-07-03 ENCOUNTER — TELEPHONE (OUTPATIENT)
Dept: NEUROLOGY | Facility: CLINIC | Age: 50
End: 2025-07-03
Payer: COMMERCIAL

## 2025-07-03 PROBLEM — G43.009 MIGRAINE WITHOUT AURA AND WITHOUT STATUS MIGRAINOSUS, NOT INTRACTABLE: Status: ACTIVE | Noted: 2025-07-03

## 2025-07-03 NOTE — TELEPHONE ENCOUNTER
I gave her Nurtec not Gabapentin, ask Dr Seipel   
PT CALLED TO CHECK ON STATUS - ADVISED BY CLINIC SOMEONE WILL CONTACT HER SOON IN REGARDS TO THE SITUATION. SHE VU  
Patient is requesting refill on her gabapentin called pharmacy to verify  
Patient saw you last  
no visual changes

## 2025-07-03 NOTE — PROGRESS NOTES
Specialty Pharmacy Patient Management Program  Refill Outreach   error     aN Crook, Pharmacy Technician  7/3/2025  10:37 EDT

## 2025-07-03 NOTE — PROGRESS NOTES
Specialty Pharmacy Patient Management Program  Refill Outreach   [facility] [id number]     Na Crook, Pharmacy Technician  7/3/2025  09:13 EDT

## 2025-07-03 NOTE — TELEPHONE ENCOUNTER
Chart reviewed.  This patient has been taking gabapentin  800 mg 4 times a day for pain in her feet associated with neuropathy.  She has not been receiving this from other doctors therefore this was refilled x 5 which should be sufficient until she is seen again here for her appointment in December.

## 2025-08-26 ENCOUNTER — OFFICE VISIT (OUTPATIENT)
Dept: ORTHOPEDIC SURGERY | Facility: CLINIC | Age: 50
End: 2025-08-26
Payer: COMMERCIAL

## 2025-08-26 VITALS — BODY MASS INDEX: 26.06 KG/M2 | HEIGHT: 67 IN | WEIGHT: 166 LBS | RESPIRATION RATE: 20 BRPM

## 2025-08-26 DIAGNOSIS — M17.12 PRIMARY OSTEOARTHRITIS OF LEFT KNEE: ICD-10-CM

## 2025-08-26 DIAGNOSIS — G89.29 CHRONIC PAIN OF LEFT KNEE: Primary | ICD-10-CM

## 2025-08-26 DIAGNOSIS — M25.562 CHRONIC PAIN OF LEFT KNEE: Primary | ICD-10-CM
